# Patient Record
Sex: FEMALE | Race: WHITE | NOT HISPANIC OR LATINO | Employment: OTHER | URBAN - NONMETROPOLITAN AREA
[De-identification: names, ages, dates, MRNs, and addresses within clinical notes are randomized per-mention and may not be internally consistent; named-entity substitution may affect disease eponyms.]

---

## 2017-03-29 ENCOUNTER — HISTORY (OUTPATIENT)
Dept: FAMILY MEDICINE | Facility: OTHER | Age: 27
End: 2017-03-29

## 2017-03-29 ENCOUNTER — OFFICE VISIT - GICH (OUTPATIENT)
Dept: FAMILY MEDICINE | Facility: OTHER | Age: 27
End: 2017-03-29

## 2017-03-29 DIAGNOSIS — Z30.09 ENCOUNTER FOR OTHER GENERAL COUNSELING AND ADVICE ON CONTRACEPTION: ICD-10-CM

## 2017-03-29 ASSESSMENT — PATIENT HEALTH QUESTIONNAIRE - PHQ9: SUM OF ALL RESPONSES TO PHQ QUESTIONS 1-9: 4

## 2017-07-27 ENCOUNTER — HISTORY (OUTPATIENT)
Dept: FAMILY MEDICINE | Facility: OTHER | Age: 27
End: 2017-07-27

## 2017-07-27 ENCOUNTER — COMMUNICATION - GICH (OUTPATIENT)
Dept: FAMILY MEDICINE | Facility: OTHER | Age: 27
End: 2017-07-27

## 2017-07-27 ENCOUNTER — OFFICE VISIT - GICH (OUTPATIENT)
Dept: FAMILY MEDICINE | Facility: OTHER | Age: 27
End: 2017-07-27

## 2017-07-27 DIAGNOSIS — Z11.3 ENCOUNTER FOR SCREENING FOR INFECTIONS WITH PREDOMINANTLY SEXUAL MODE OF TRANSMISSION: ICD-10-CM

## 2017-07-27 DIAGNOSIS — Z00.00 ENCOUNTER FOR GENERAL ADULT MEDICAL EXAMINATION WITHOUT ABNORMAL FINDINGS: ICD-10-CM

## 2017-07-27 DIAGNOSIS — N92.6 IRREGULAR MENSTRUATION: ICD-10-CM

## 2017-07-27 DIAGNOSIS — Z12.4 ENCOUNTER FOR SCREENING FOR MALIGNANT NEOPLASM OF CERVIX: ICD-10-CM

## 2017-07-27 LAB — HCG UR QL: NEGATIVE

## 2017-08-04 ENCOUNTER — AMBULATORY - GICH (OUTPATIENT)
Dept: EMERGENCY MEDICINE | Facility: OTHER | Age: 27
End: 2017-08-04

## 2017-08-04 ENCOUNTER — HISTORY (OUTPATIENT)
Dept: EMERGENCY MEDICINE | Facility: OTHER | Age: 27
End: 2017-08-04

## 2017-08-04 LAB
BACTERIA URINE: ABNORMAL BACTERIA/HPF
BILIRUB UR QL: ABNORMAL
CLARITY, URINE: CLEAR CLARITY
COLOR UR: YELLOW COLOR
EPITHELIAL CELLS: ABNORMAL EPI/HPF
GLUCOSE URINE: NEGATIVE MG/DL
KETONES UR QL: NEGATIVE MG/DL
LEUKOCYTE ESTERASE URINE: NEGATIVE
NITRITE UR QL STRIP: NEGATIVE
OCCULT BLOOD,URINE - HISTORICAL: ABNORMAL
OTHER: ABNORMAL
PH UR: 5.5 [PH]
PROTEIN QUALITATIVE,URINE - HISTORICAL: NEGATIVE MG/DL
RBC - HISTORICAL: ABNORMAL /HPF
SP GR UR STRIP: 1.02
UROBILINOGEN,QUALITATIVE - HISTORICAL: NORMAL EU/DL
WBC - HISTORICAL: ABNORMAL /HPF

## 2017-08-18 ENCOUNTER — HISTORY (OUTPATIENT)
Dept: FAMILY MEDICINE | Facility: OTHER | Age: 27
End: 2017-08-18

## 2017-08-18 ENCOUNTER — OFFICE VISIT - GICH (OUTPATIENT)
Dept: FAMILY MEDICINE | Facility: OTHER | Age: 27
End: 2017-08-18

## 2017-08-18 DIAGNOSIS — Z30.430 ENCOUNTER FOR INSERTION OF INTRAUTERINE CONTRACEPTIVE DEVICE: ICD-10-CM

## 2017-08-18 LAB — HCG UR QL: NEGATIVE

## 2017-12-17 ENCOUNTER — HEALTH MAINTENANCE LETTER (OUTPATIENT)
Age: 27
End: 2017-12-17

## 2017-12-27 NOTE — PROGRESS NOTES
Patient Information     Patient Name MRN Sex Caryl Cartwright [id number] Female 1990      Progress Notes by Lakshmi De La Paz MD at 2017  9:30 AM     Author:  Lakshmi De La Paz MD Service:  (none) Author Type:  Physician     Filed:  2017  3:16 PM Encounter Date:  2017 Status:  Signed     :  Lakshmi De La Paz MD (Physician)            25 yo female presents for IUD insertion. Recent STD screen negative. LMP 2 weeks ago    UPT  Negative    /70  Pulse 90  Wt 66.3 kg (146 lb 3.2 oz)  LMP 2017  BMI 22.9 kg/m2  ABd: soft nondistended  Gyn: uterus normal size and contour    Informed consent obtained. Patient is aware of potential risks of bleeding, infection uterine perforation. Patient has a funny position. Speculum is inserted. Cervix appears normal. Cleaned with betadine. Tenaculum was placed on the anterior lip. Gentle traction is applied and uterine sound was inserted. 7 cm. IUD placed without difficulty. Strings clipped 3 cm outside cervical os. Patient tolerated procedure well without complications.    IMpression:    ICD-10-CM    1. Encounter for IUD insertion Z30.430 Urine pregnancy test (HCG), qualitative      Urine pregnancy test (HCG), qualitative      levonorgestrel intrauterine device 1 Device (MIRENA)     Plan: check strings every month, discussed STD prevention    Lakshmi Stubbs MD  3:16 PM 2017

## 2017-12-28 NOTE — TELEPHONE ENCOUNTER
Patient Information     Patient Name MRN Caryl Montaño [id number] Female 1990      Telephone Encounter by Marnie Boswell at 2017  9:05 AM     Author:  Marnie Boswell Service:  (none) Author Type:  (none)     Filed:  2017  9:10 AM Encounter Date:  2017 Status:  Signed     :  Marnie Boswell            Patient states has been seeing someone and no longer on bc. And having some weird spotting.   Marnie Boswell LPN .............2017  9:07 AM  lmp 17. suggested be seen for labs for HCG.  Marnie Boswell LPN .............2017  9:10 AM

## 2017-12-28 NOTE — PROGRESS NOTES
Patient Information     Patient Name MRN Sex Caryl Cartwright [id number] Female 1990      Progress Notes by Lakshmi De La Paz MD at 2017 10:45 AM     Author:  Lakshmi De La Paz MD Service:  (none) Author Type:  Physician     Filed:  2017  2:23 PM Encounter Date:  2017 Status:  Signed     :  Lakshmi De La Paz MD (Physician)            SUBJECTIVE:    Caryl Andres is a 26 y.o. female who presents for irregular menses    HPI Comments: Dark brown discharge since , LMP 2017  Took OCPs x 1 month in April  New sexual partner x 3 months, no using condoms        No Known Allergies,   No current outpatient prescriptions on file prior to visit.     No current facility-administered medications on file prior to visit.     and   Patient Active Problem List      Diagnosis Date Noted     Twin pregnancy, antepartum 10/19/2014     Active labor 10/19/2014      delivery 10/19/2014     Gestational hypertension 2014     Twin gestation, monochorionic/diamniotic (one placenta, two amniotic sacs)(V91.02) 2014     Cervicalgia 2012     NEPHROLITHIASIS 2012     LUMBAGO 2012       REVIEW OF SYSTEMS:  Review of Systems   Constitutional: Negative for chills, fever, malaise/fatigue and weight loss.   Respiratory: Negative for cough and shortness of breath.    Cardiovascular: Negative for chest pain and palpitations.   Gastrointestinal: Negative for abdominal pain, blood in stool, constipation, diarrhea, nausea and vomiting.   Genitourinary: Negative for dysuria and frequency.   Neurological: Negative for headaches.   Psychiatric/Behavioral: Negative for depression.       OBJECTIVE:  /74  Pulse 86  Wt 68 kg (150 lb)  LMP 2017  BMI 23.49 kg/m2    EXAM:   Physical Exam   Constitutional: She is well-developed, well-nourished, and in no distress.   HENT:   Head: Normocephalic.   Right Ear: External ear normal.   Nose: Nose normal.    Mouth/Throat: Oropharynx is clear and moist.   Cardiovascular: Normal rate.    No murmur heard.  Pulmonary/Chest: Effort normal and breath sounds normal. No respiratory distress.   Abdominal: Soft. Bowel sounds are normal. She exhibits no distension.   Genitourinary: Uterus normal, cervix normal, right adnexa normal and left adnexa normal. Vaginal discharge found.   Musculoskeletal: She exhibits no edema.   Psychiatric: Affect normal.     Results for orders placed or performed in visit on 07/27/17       Urine pregnancy test (HCG), qualitative       Result  Value Ref Range Status    PREGNANCY,URINE           Negative Negative Final       ASSESSMENT/PLAN:    ICD-10-CM    1. Missed period N92.6 Urine pregnancy test (HCG), qualitative      Urine pregnancy test (HCG), qualitative   2. Cervical cancer screening Z12.4 GYN THIN PREP PAP SCREEN IMAGED      GYN THIN PREP PAP SCREEN IMAGED   3. Screen for STD (sexually transmitted disease) Z11.3 GC CHLAMYDIA TRACH PROBE      GC CHLAMYDIA TRACH PROBE        Plan:  STd screen, due for pap. Appears on exam the menses is starting now. Discussed contraception options, she plans IUD insertion next week. Encouraged condoms  Lakshmi Stubbs MD  2:22 PM 7/27/2017

## 2017-12-30 NOTE — NURSING NOTE
Patient Information     Patient Name MRN Sex Caryl Cartwright [id number] Female 1990      Nursing Note by Marnie Boswell at 2017 10:45 AM     Author:  Marnie Boswell Service:  (none) Author Type:  (none)     Filed:  2017 10:37 AM Encounter Date:  2017 Status:  Signed     :  Marnie Boswell            Patient is here for possible pregnancy. States not on bc and seeing someone, having abnormal spotting.  Marnie Boswell LPN .............2017  10:13 AM

## 2017-12-30 NOTE — NURSING NOTE
Patient Information     Patient Name MRN Sex Caryl Cartwright [id number] Female 1990      Nursing Note by Marnie Boswell at 2017  9:30 AM     Author:  Marnie Boswell Service:  (none) Author Type:  (none)     Filed:  2017  9:57 AM Encounter Date:  2017 Status:  Signed     :  Marnie Boswell            Patient is here to have iud placed.  Marnie Boswell LPN .............2017  9:26 AM    Universal Protocol    A. Pre-procedure verification complete yes  1-relevant information / documentation available, reviewed and properly matched to the patient; 2-consent accurate and complete, 3-equipment and supplies available    B. Site marking complete Yes  Site marked if not in continuous attendance with patient    C. TIME OUT completed yes  Time Out was conducted just prior to starting procedure to verify the eight required elements: 1-patient identity, 2-consent accurate and complete, 3-position, 4-correct side/site marked (if applicable), 5-procedure, 6-relevant images / results properly labeled and displayed (if applicable), 7-antibiotics / irrigation fluids (if applicable), 8-safety precautions.

## 2018-01-04 NOTE — NURSING NOTE
Patient Information     Patient Name MRN Caryl Montaño [id number] Female 1990      Nursing Note by Marnie Boswell at 3/29/2017 10:30 AM     Author:  Marnie Boswell Service:  (none) Author Type:  (none)     Filed:  3/29/2017 11:03 AM Encounter Date:  3/29/2017 Status:  Signed     :  Marnie Boswell            Patient is here to discuss birth control and options.  Marnie Boswell LPN .............3/29/2017  10:44 AM

## 2018-01-04 NOTE — PROGRESS NOTES
Patient Information     Patient Name MRN Sex Caryl Cartwright [id number] Female 1990      Progress Notes by Lakshmi De La Paz MD at 3/29/2017 10:30 AM     Author:  Lakshmi De La Paz MD Service:  (none) Author Type:  Physician     Filed:  3/31/2017  8:31 AM Encounter Date:  3/29/2017 Status:  Signed     :  Lakshmi De La Paz MD (Physician)            SUBJECTIVE:    Caryl Andres is a 26 y.o. female who presents for birth control    HPI Comments:  presents to discuss birth control. They have decided they are done having children, do not want to do anything permanent  She has regular menses, light, no abnormal pap  Nonsmoker, no h/o VTE  Same partner > 5 years      No Known Allergies and   No current outpatient prescriptions on file prior to visit.     No current facility-administered medications on file prior to visit.        REVIEW OF SYSTEMS:  Review of Systems   Constitutional: Negative for chills, fever and weight loss.   Respiratory: Negative for shortness of breath.    Cardiovascular: Negative for chest pain.   Musculoskeletal: Negative for joint pain.   Neurological: Negative for dizziness and headaches.   Psychiatric/Behavioral: Negative for depression.       OBJECTIVE:  /66  Pulse 81  Wt 67.9 kg (149 lb 9.6 oz)  LMP 2017 (LMP Unknown)  BMI 23.43 kg/m2    EXAM:   Physical Exam   Constitutional: She is well-developed, well-nourished, and in no distress.   Neck: No thyromegaly present.   Psychiatric: Affect and judgment normal.       ASSESSMENT/PLAN:    ICD-10-CM    1. Birth control counseling Z30.9 levonorgestrel-ethinyl estrad, 0.15-30 mg-mcg, (LEVLEN; NORDETTE-28) 0.15-0.03 mg tablet        Plan:  Reviewed options for birth control, she would like to try the pill, will start combined OCPs on first day of next menses, due for pap smear  Total of 15 minutes was spent in counseling and coordination of care.    Lakshmi Stubbs MD  8:29 AM  3/31/2017     There are no Patient Instructions on file for this visit.

## 2018-01-27 VITALS
SYSTOLIC BLOOD PRESSURE: 116 MMHG | HEART RATE: 90 BPM | SYSTOLIC BLOOD PRESSURE: 111 MMHG | HEART RATE: 81 BPM | WEIGHT: 149.6 LBS | BODY MASS INDEX: 22.9 KG/M2 | DIASTOLIC BLOOD PRESSURE: 70 MMHG | BODY MASS INDEX: 23.43 KG/M2 | DIASTOLIC BLOOD PRESSURE: 66 MMHG | WEIGHT: 146.2 LBS

## 2018-01-27 VITALS
SYSTOLIC BLOOD PRESSURE: 128 MMHG | DIASTOLIC BLOOD PRESSURE: 74 MMHG | WEIGHT: 150 LBS | HEART RATE: 86 BPM | BODY MASS INDEX: 23.49 KG/M2

## 2018-01-30 ASSESSMENT — PATIENT HEALTH QUESTIONNAIRE - PHQ9: SUM OF ALL RESPONSES TO PHQ QUESTIONS 1-9: 4

## 2018-02-05 ENCOUNTER — DOCUMENTATION ONLY (OUTPATIENT)
Dept: FAMILY MEDICINE | Facility: OTHER | Age: 28
End: 2018-02-05

## 2018-03-04 ENCOUNTER — HEALTH MAINTENANCE LETTER (OUTPATIENT)
Age: 28
End: 2018-03-04

## 2020-02-28 ENCOUNTER — HOSPITAL ENCOUNTER (OUTPATIENT)
Dept: GENERAL RADIOLOGY | Facility: OTHER | Age: 30
End: 2020-02-28
Attending: FAMILY MEDICINE
Payer: COMMERCIAL

## 2020-02-28 ENCOUNTER — OFFICE VISIT (OUTPATIENT)
Dept: FAMILY MEDICINE | Facility: OTHER | Age: 30
End: 2020-02-28
Attending: FAMILY MEDICINE
Payer: COMMERCIAL

## 2020-02-28 VITALS
SYSTOLIC BLOOD PRESSURE: 116 MMHG | RESPIRATION RATE: 16 BRPM | WEIGHT: 153.4 LBS | DIASTOLIC BLOOD PRESSURE: 78 MMHG | TEMPERATURE: 97.9 F | BODY MASS INDEX: 24.08 KG/M2 | OXYGEN SATURATION: 97 % | HEART RATE: 100 BPM | HEIGHT: 67 IN

## 2020-02-28 DIAGNOSIS — R10.9 ABDOMINAL CRAMPING: Primary | ICD-10-CM

## 2020-02-28 DIAGNOSIS — K59.00 CONSTIPATION, UNSPECIFIED CONSTIPATION TYPE: ICD-10-CM

## 2020-02-28 DIAGNOSIS — R10.9 ABDOMINAL CRAMPING: ICD-10-CM

## 2020-02-28 DIAGNOSIS — R39.15 URINARY URGENCY: ICD-10-CM

## 2020-02-28 LAB
ALBUMIN UR-MCNC: NEGATIVE MG/DL
APPEARANCE UR: CLEAR
BILIRUB UR QL STRIP: NEGATIVE
COLOR UR AUTO: ABNORMAL
GLUCOSE UR STRIP-MCNC: NEGATIVE MG/DL
HGB UR QL STRIP: ABNORMAL
KETONES UR STRIP-MCNC: NEGATIVE MG/DL
LEUKOCYTE ESTERASE UR QL STRIP: NEGATIVE
MUCOUS THREADS #/AREA URNS LPF: PRESENT /LPF
NITRATE UR QL: NEGATIVE
PH UR STRIP: 5.5 PH (ref 5–7)
RBC #/AREA URNS AUTO: 2 /HPF (ref 0–2)
SOURCE: ABNORMAL
SP GR UR STRIP: 1.01 (ref 1–1.03)
UROBILINOGEN UR STRIP-MCNC: NORMAL MG/DL (ref 0–2)
WBC #/AREA URNS AUTO: <1 /HPF (ref 0–5)

## 2020-02-28 PROCEDURE — 74018 RADEX ABDOMEN 1 VIEW: CPT

## 2020-02-28 PROCEDURE — G0463 HOSPITAL OUTPT CLINIC VISIT: HCPCS

## 2020-02-28 PROCEDURE — 99213 OFFICE O/P EST LOW 20 MIN: CPT | Performed by: FAMILY MEDICINE

## 2020-02-28 PROCEDURE — 81001 URINALYSIS AUTO W/SCOPE: CPT | Mod: ZL | Performed by: FAMILY MEDICINE

## 2020-02-28 ASSESSMENT — ENCOUNTER SYMPTOMS
FREQUENCY: 1
VOMITING: 0
DIARRHEA: 0
DYSURIA: 0
NAUSEA: 1

## 2020-02-28 ASSESSMENT — PATIENT HEALTH QUESTIONNAIRE - PHQ9: SUM OF ALL RESPONSES TO PHQ QUESTIONS 1-9: 8

## 2020-02-28 ASSESSMENT — MIFFLIN-ST. JEOR: SCORE: 1445.51

## 2020-02-28 ASSESSMENT — PAIN SCALES - GENERAL: PAINLEVEL: MODERATE PAIN (4)

## 2020-02-28 NOTE — PROGRESS NOTES
"  SUBJECTIVE:   Caryl Andres is a 29 year old female who presents to clinic today for the following health issues:    HPI  Abdominal Cramping, Constipation:  Symptoms started on Saturday with abdominal cramping.  Reports radiation to her low back.  Has been worse with activity and improved with rest.  Her last bowel movement was yesterday but reports that her stools have looked like diarrhea and \"small alena.\"  She states this is irregular for her and has been occurring over the past week.  She noticed blood on the toilet paper this morning when wiping.  LMP was 2/16/20.    Urinary Urgency:  Onset of symptoms with above.  Reports associated urgency.  No real dysuria but is concerned about possible urinary tract infection.    Past Medical History:   Diagnosis Date     Closed fracture of shaft of humerus     No Comments Provided     Excessive and frequent menstruation with regular cycle     No Comments Provided     Personal history of other medical treatment (CODE)     G2, P1, SAB1 (7 weeks)      Past Surgical History:   Procedure Laterality Date     ELBOW SURGERY      Fracture repair ?pinning     History reviewed. No pertinent family history.  Social History     Tobacco Use     Smoking status: Current Every Day Smoker     Packs/day: 1.00     Years: 10.00     Pack years: 10.00     Smokeless tobacco: Never Used   Substance Use Topics     Alcohol use: Yes     Comment: Alcoholic Drinks/day: rare     Current Outpatient Medications   Medication Sig Dispense Refill     levonorgestrel (MIRENA) 20 MCG/24HR IUD 1 Device by Intrauterine route       No Known Allergies    Review of Systems   Cardiovascular: Negative for chest pain.   Gastrointestinal: Positive for nausea. Negative for diarrhea and vomiting.   Genitourinary: Positive for frequency and urgency. Negative for dysuria.      OBJECTIVE:     /78   Pulse 100   Temp 97.9  F (36.6  C) (Temporal)   Resp 16   Ht 1.689 m (5' 6.5\")   Wt 69.6 kg (153 lb 6.4 " oz)   LMP 02/21/2020   SpO2 97%   BMI 24.39 kg/m    Body mass index is 24.39 kg/m .  Physical Exam  Constitutional:       General: She is not in acute distress.     Appearance: Normal appearance. She is not ill-appearing.   Cardiovascular:      Rate and Rhythm: Normal rate and regular rhythm.      Heart sounds: No murmur. No friction rub. No gallop.    Pulmonary:      Effort: Pulmonary effort is normal.      Breath sounds: Normal breath sounds. No wheezing, rhonchi or rales.   Abdominal:      General: Bowel sounds are normal.      Palpations: Abdomen is soft.      Tenderness: There is abdominal tenderness in the right lower quadrant, suprapubic area and left lower quadrant. There is no guarding or rebound. Negative signs include Reagan's sign, Rovsing's sign, psoas sign and obturator sign.   Genitourinary:     Exam position: Lithotomy position.      Labia:         Right: No lesion or injury.         Left: No lesion or injury.       Urethra: No prolapse.      Cervix: Cervical bleeding present. No friability or erythema.      Comments: Blood pooled in vaginal vault.  IUD strings in place.  Neurological:      Mental Status: She is alert.       Diagnostic Test Results:  Results for orders placed or performed during the hospital encounter of 02/28/20   XR Abdomen 1 View     Status: None    Narrative    XR ABDOMEN 1 VW    HISTORY: 29 years Female Please comment on stool burden; Abdominal  cramping; Constipation, unspecified constipation type    COMPARISON: None    TECHNIQUE: Single view abdomen    FINDINGS: There is a small volume of stool in the colon. No abnormally  distended loops of bowel are present.      Impression    IMPRESSION: Small volume of stool in the colon.    An intrauterine device is present.    NAE MALONEY MD   Results for orders placed or performed in visit on 02/28/20   UA reflex to Microscopic and Culture     Status: Abnormal   Result Value Ref Range    Color Urine Light Yellow     Appearance  Urine Clear     Glucose Urine Negative NEG^Negative mg/dL    Bilirubin Urine Negative NEG^Negative    Ketones Urine Negative NEG^Negative mg/dL    Specific Gravity Urine 1.015 1.003 - 1.035    Blood Urine Small (A) NEG^Negative    pH Urine 5.5 5.0 - 7.0 pH    Protein Albumin Urine Negative NEG^Negative mg/dL    Urobilinogen mg/dL Normal 0.0 - 2.0 mg/dL    Nitrite Urine Negative NEG^Negative    Leukocyte Esterase Urine Negative NEG^Negative    Source Midstream Urine     RBC Urine 2 0 - 2 /HPF    WBC Urine <1 0 - 5 /HPF    Mucous Urine Present (A) NEG^Negative /LPF     ASSESSMENT/PLAN:     1. Abdominal cramping  Suspect secondary to menstrual cycle which is earlier than expected.  Discussed that this can be a normal variant.  Recommend NSAID medications as needed for pain relief and continued monitoring.  Return for reevaluation if symptoms worsening or failing to improve.  - XR Abdomen 1 View; Future    2. Constipation, unspecified constipation type  Small amount of stool evident on KUB and history suggestive of leak-around constipation.  Dicussed adequate hydration, increasing dietary fiber, and use of Miralax daily until bowel movements regular.  - XR Abdomen 1 View; Future    3. Urinary urgency  Urinalysis negative for infection.  May be related to above.  Return for reevaluation if symptoms worsening or failing to improve.  - UA reflex to Microscopic and Culture      DO JOSE ALEJANDRO Link [facility] AND [facility]

## 2020-02-28 NOTE — NURSING NOTE
"Chief Complaint   Patient presents with     Abdominal Pain     check IUD     Saturday she had a fever, abd cramps and some bleeding.    Initial /78   Pulse 100   Temp 97.9  F (36.6  C) (Temporal)   Resp 16   Ht 1.689 m (5' 6.5\")   Wt 69.6 kg (153 lb 6.4 oz)   LMP 02/21/2020   SpO2 97%   BMI 24.39 kg/m   Estimated body mass index is 24.39 kg/m  as calculated from the following:    Height as of this encounter: 1.689 m (5' 6.5\").    Weight as of this encounter: 69.6 kg (153 lb 6.4 oz).    Medication Reconciliation: complete      Norma J. Gosselin, LPN  "

## 2022-09-12 ENCOUNTER — OFFICE VISIT (OUTPATIENT)
Dept: FAMILY MEDICINE | Facility: OTHER | Age: 32
End: 2022-09-12
Attending: FAMILY MEDICINE
Payer: COMMERCIAL

## 2022-09-12 VITALS
SYSTOLIC BLOOD PRESSURE: 122 MMHG | OXYGEN SATURATION: 97 % | HEART RATE: 87 BPM | TEMPERATURE: 98 F | WEIGHT: 163.8 LBS | RESPIRATION RATE: 16 BRPM | DIASTOLIC BLOOD PRESSURE: 64 MMHG | BODY MASS INDEX: 26.04 KG/M2

## 2022-09-12 DIAGNOSIS — Z12.4 CERVICAL CANCER SCREENING: ICD-10-CM

## 2022-09-12 DIAGNOSIS — Z30.432 ENCOUNTER FOR IUD REMOVAL: ICD-10-CM

## 2022-09-12 DIAGNOSIS — Z00.00 ANNUAL PHYSICAL EXAM: Primary | ICD-10-CM

## 2022-09-12 PROCEDURE — 87624 HPV HI-RISK TYP POOLED RSLT: CPT | Mod: ZL | Performed by: FAMILY MEDICINE

## 2022-09-12 PROCEDURE — 99395 PREV VISIT EST AGE 18-39: CPT | Mod: 25 | Performed by: FAMILY MEDICINE

## 2022-09-12 PROCEDURE — G0463 HOSPITAL OUTPT CLINIC VISIT: HCPCS

## 2022-09-12 PROCEDURE — 58301 REMOVE INTRAUTERINE DEVICE: CPT | Performed by: FAMILY MEDICINE

## 2022-09-12 PROCEDURE — G0123 SCREEN CERV/VAG THIN LAYER: HCPCS | Performed by: FAMILY MEDICINE

## 2022-09-12 ASSESSMENT — PAIN SCALES - GENERAL: PAINLEVEL: NO PAIN (0)

## 2022-09-12 NOTE — NURSING NOTE
"Chief Complaint   Patient presents with     IUD     removal       Initial /64   Pulse 87   Temp 98  F (36.7  C) (Tympanic)   Resp 16   Wt 74.3 kg (163 lb 12.8 oz)   LMP 09/12/2022   SpO2 97%   BMI 26.04 kg/m   Estimated body mass index is 26.04 kg/m  as calculated from the following:    Height as of 2/28/20: 1.689 m (5' 6.5\").    Weight as of this encounter: 74.3 kg (163 lb 12.8 oz).  Medication Reconciliation: complete    Elena Vuong LPN    Advance Care Directive reviewed    "

## 2022-09-12 NOTE — PROGRESS NOTES
Assessment & Plan     Annual physical exam  Patient is counseled on importance of regular self breast exams and mammograms every 1-2 years starting at age 40. Patient will proceed with pap smears every 3-5 years until age 65. Discussed importance of calcium and vitamin D supplementation and osteoporosis screening. Immunizations are updated based on CDC recommendations and patients desire. Reviewed importance of sunscreen, limit sun exposure and monitoring for changing moles with ABCDEs. Recommend seatbelt use and helmets with biking, skiing and ATV/Snowmobile use.    Recommend COVID-vaccine.    Cervical cancer screening  Pap smear obtained today  - Pap Screen with HPV - recommended age 30 - 65 years    Encounter for IUD removal  IUD removed easily.  Patient declines contraception.               Nicotine/Tobacco Cessation:  She reports that she has been smoking. She has a 10.00 pack-year smoking history. She has never used smokeless tobacco.  Nicotine/Tobacco Cessation Plan:   Information offered: Patient not interested at this time      Regular exercise  See Patient Instructions    No follow-ups on file.    Lakshmi De La Paz MD  [facility] AND [facility]   Caryl is a 32 year old, presenting for the following health issues:    32-year-old G2, P3 presents for IUD removal and pap smear.  It was placed after delivery of her twins.  She is considering attempts at pregnancy with her new partner.  She gets an irregular menses.  Last Pap smear in 2016 was normal.  Patient currently smokes 1 pack of cigarettes per day.  No interest in quitting.    IUD (removal)      IUD               Review of Systems   Constitutional, HEENT, cardiovascular, pulmonary, gi and gu systems are negative, except as otherwise noted.      Objective    /64   Pulse 87   Temp 98  F (36.7  C) (Tympanic)   Resp 16   Wt 74.3 kg (163 lb 12.8 oz)   LMP 09/12/2022   SpO2 97%   BMI 26.04 kg/m    Body mass index is  26.04 kg/m .  Physical Exam  Abdominal:      General: Abdomen is flat. Bowel sounds are normal.   Genitourinary:     General: Normal vulva.      Comments: Moderate blood.  Cervix appears noninflamed.  IUD string strings are grasped with a ring forceps and with gentle traction easily removed.  Pap smear is not obtained.  Bimanual exam reveals a firm small nontender uterus.  Neurological:      Mental Status: She is alert.

## 2022-09-16 LAB
BKR LAB AP GYN ADEQUACY: NORMAL
BKR LAB AP GYN INTERPRETATION: NORMAL
BKR LAB AP HPV REFLEX: NORMAL
BKR LAB AP LMP: NORMAL
BKR LAB AP PREVIOUS ABNORMAL: NORMAL
PATH REPORT.COMMENTS IMP SPEC: NORMAL
PATH REPORT.COMMENTS IMP SPEC: NORMAL
PATH REPORT.RELEVANT HX SPEC: NORMAL

## 2022-09-22 LAB
HUMAN PAPILLOMA VIRUS 16 DNA: NEGATIVE
HUMAN PAPILLOMA VIRUS 18 DNA: NEGATIVE
HUMAN PAPILLOMA VIRUS FINAL DIAGNOSIS: NORMAL
HUMAN PAPILLOMA VIRUS OTHER HR: NEGATIVE

## 2022-12-07 ENCOUNTER — OFFICE VISIT (OUTPATIENT)
Dept: FAMILY MEDICINE | Facility: OTHER | Age: 32
End: 2022-12-07
Attending: NURSE PRACTITIONER
Payer: COMMERCIAL

## 2022-12-07 VITALS
OXYGEN SATURATION: 98 % | TEMPERATURE: 99.1 F | RESPIRATION RATE: 20 BRPM | SYSTOLIC BLOOD PRESSURE: 122 MMHG | DIASTOLIC BLOOD PRESSURE: 80 MMHG | HEART RATE: 86 BPM | BODY MASS INDEX: 26.23 KG/M2 | WEIGHT: 165 LBS

## 2022-12-07 DIAGNOSIS — J06.9 VIRAL URI: ICD-10-CM

## 2022-12-07 DIAGNOSIS — H66.90 ACUTE OTITIS MEDIA, UNSPECIFIED OTITIS MEDIA TYPE: Primary | ICD-10-CM

## 2022-12-07 PROCEDURE — 99213 OFFICE O/P EST LOW 20 MIN: CPT

## 2022-12-07 PROCEDURE — G0463 HOSPITAL OUTPT CLINIC VISIT: HCPCS

## 2022-12-07 ASSESSMENT — PAIN SCALES - GENERAL: PAINLEVEL: MILD PAIN (3)

## 2022-12-07 NOTE — PROGRESS NOTES
ASSESSMENT/PLAN:    Differential Diagnoses:     (H66.90) Acute otitis media, unspecified otitis media type  (primary encounter diagnosis)  Comment: Bilateral with pain.   Plan: amoxicillin-clavulanate (AUGMENTIN) 875-125 MG         tablet       For your ear infection you may start your antibiotic- Augmentin. Make sure you take with food and also take a daily probiotic while on the antibiotic. Complete the full course of antibiotic even if you are feeling better. Follow up if ear pain persists after 2-3 days of treatment or if you experience worsening of symptoms.     (J06.9) Viral URI  Comment: Symptoms consistent with viral URI.   Plan:   Discussed with patient that symptoms and exam are consistent with viral illness.  Discussed that symptomatic treatment of cough is appropriate but not with antibiotics.      Symptomatic treatment - Encouraged fluids, salt water gargles, honey (only if greater than 1 year in age due to risk of botulism), elevation, humidifier, sinus rinse/netti pot, lozenges, tea, topical vapor rub, popsicles, rest, etc     May use over-the-counter Tylenol or ibuprofen PRN    Discussed warning signs/symptoms indicative of need to f/u    Follow up if symptoms persist or worsen or concerns    I have reviewed the nursing notes.  I have reviewed the findings, diagnosis, plan and need for follow up with the patient.    I explained my diagnostic considerations and recommendations to the patient, who voiced understanding and agreement with the treatment plan. All questions were answered. We discussed potential side effects of any prescribed or recommended therapies, as well as expectations for response to treatments.    SHANNON HOLDER CNP  12/7/2022  11:04 AM    HPI:    Caryl Andres is a 32 year old female  who presents to [facility] today for concerns of URI and otalgia.    Patient notes onset of symptoms about a week ago. Symptoms include cough and sore throat which have persisted. She  also endorses rhinorrhea, congestion, and fatigue. Appetite stable, able to take fluids well. No N/V/D. No rashes. She does endorse bilateral otalgia x 1 day, she states they feel full and she hears a whooshing noise. Hearing is muffled at times. No tinnitus. Yesterday she noticed she was a little off balance but has since resolved.     Home treatment: tea, water, vicks, etc.     PCP: Dr. De La Paz    Allergies: NKA    No known exposures to anything.     Past Medical History:   Diagnosis Date     Closed fracture of shaft of humerus     No Comments Provided     Excessive and frequent menstruation with regular cycle     No Comments Provided     Personal history of other medical treatment (CODE)     G2, P1, SAB1 (7 weeks)     Past Surgical History:   Procedure Laterality Date     ELBOW SURGERY      Fracture repair ?pinning     Social History     Tobacco Use     Smoking status: Every Day     Packs/day: 1.00     Years: 10.00     Pack years: 10.00     Types: Cigarettes     Smokeless tobacco: Never   Substance Use Topics     Alcohol use: Yes     Comment: Alcoholic Drinks/day: rare     Current Outpatient Medications   Medication Sig Dispense Refill     levonorgestrel (MIRENA) 20 MCG/24HR IUD 1 Device by Intrauterine route (Patient not taking: Reported on 12/7/2022)       No Known Allergies  Past medical history, past surgical history, current medications and allergies reviewed and accurate to the best of my knowledge.      ROS:  Refer to HPI    /80 (BP Location: Right arm, Patient Position: Sitting, Cuff Size: Adult Regular)   Pulse 86   Temp 99.1  F (37.3  C) (Tympanic)   Resp 20   Wt 74.8 kg (165 lb)   LMP 11/14/2022 (Exact Date)   SpO2 98%   BMI 26.23 kg/m      EXAM:  General Appearance: Well appearing 32 year old female, appropriate appearance for age. No acute distress   Ears: Left TM intact, with erythema, mild bulging, and purulence.  Right TM intact, with erythema, bulging, and purulence..  Left  auditory canal clear.  Right auditory canal clear.  Normal external ears, non tender.  Eyes: conjunctivae normal without erythema or irritation, corneas clear, no drainage or crusting, no eyelid swelling, pupils equal   Oropharynx: moist mucous membranes, posterior pharynx with erythema, no exudates or petechiae, no post nasal drip seen, no trismus, voice clear.    Nose:  Bilateral nares: no erythema, no edema, no drainage or congestion   Neck: supple  Respiratory: normal chest wall and respirations.  Normal effort.  Clear to auscultation bilaterally, no wheezing, crackles or rhonchi.  No increased work of breathing.  No cough appreciated.  Cardiac: RRR with no murmurs  Abdomen: soft, nontender, no rigidity, no rebound tenderness or guarding, normal bowel sounds present    Musculoskeletal:  Equal movement of bilateral upper extremities.  Equal movement of bilateral lower extremities.  Normal gait.    Dermatological: no rashes noted of exposed skin  Neuro: Alert and oriented to person, place, and time.  Cranial nerves II-XII grossly intact with no focal or lateralizing deficits.  Muscle tone normal.  Gait normal. No tremor.   Psychological: normal affect, alert, oriented, and pleasant.

## 2022-12-07 NOTE — NURSING NOTE
Pt presents to clinic today for  A sore throat worse at night and in the mornings, congestion, cough and ear ache for the last week.       FOOD SECURITY SCREENING QUESTIONS:    The next two questions are to help us understand your food security.  If you are feeling you need any assistance in this area, we have resources available to support you today.    Hunger Vital Signs:  Within the past 12 months we worried whether our food would run out before we got money to buy more. Never  Within the past 12 months the food we bought just didn't last and we didn't have money to get more. Never             Medication Reconciliation: Aftab Brown, BRAYDON,LPN on 12/7/2022 at 10:48 AM

## 2022-12-07 NOTE — PATIENT INSTRUCTIONS
For your ear infection you may start your antibiotic- Augmentin. Make sure you take with food and also take a daily probiotic while on the antibiotic. Complete the full course of antibiotic even if you are feeling better. Follow up if ear pain persists after 2-3 days of treatment or if you experience worsening of symptoms.     You have a viral URI, I recommend symptomatic treatment. Symptomatic treatment - Encouraged fluids, salt water gargles, honey (only if greater than 1 year in age due to risk of botulism), elevation, humidifier, sinus rinse/netti pot, lozenges, tea, topical vapor rub, popsicles, rest, etc

## 2024-01-10 ENCOUNTER — VIRTUAL VISIT (OUTPATIENT)
Dept: OBGYN | Facility: OTHER | Age: 34
End: 2024-01-10
Payer: COMMERCIAL

## 2024-01-10 VITALS — WEIGHT: 172 LBS | HEIGHT: 67 IN | BODY MASS INDEX: 27 KG/M2

## 2024-01-10 DIAGNOSIS — Z32.01 PREGNANCY TEST POSITIVE: Primary | ICD-10-CM

## 2024-01-10 PROCEDURE — 99207 PR OB VISIT-NO CHARGE - GICH ONLY: CPT | Mod: 93

## 2024-01-10 RX ORDER — L.ACID/L.CASEI/B.BIF/B.LON/FOS 2B CELL-50
CAPSULE ORAL
COMMUNITY

## 2024-01-10 ASSESSMENT — ANXIETY QUESTIONNAIRES
1. FEELING NERVOUS, ANXIOUS, OR ON EDGE: SEVERAL DAYS
5. BEING SO RESTLESS THAT IT IS HARD TO SIT STILL: NOT AT ALL
GAD7 TOTAL SCORE: 5
7. FEELING AFRAID AS IF SOMETHING AWFUL MIGHT HAPPEN: NOT AT ALL
GAD7 TOTAL SCORE: 5
6. BECOMING EASILY ANNOYED OR IRRITABLE: SEVERAL DAYS
2. NOT BEING ABLE TO STOP OR CONTROL WORRYING: SEVERAL DAYS
3. WORRYING TOO MUCH ABOUT DIFFERENT THINGS: SEVERAL DAYS
IF YOU CHECKED OFF ANY PROBLEMS ON THIS QUESTIONNAIRE, HOW DIFFICULT HAVE THESE PROBLEMS MADE IT FOR YOU TO DO YOUR WORK, TAKE CARE OF THINGS AT HOME, OR GET ALONG WITH OTHER PEOPLE: SOMEWHAT DIFFICULT

## 2024-01-10 ASSESSMENT — PAIN SCALES - GENERAL: PAINLEVEL: NO PAIN (0)

## 2024-01-10 ASSESSMENT — PATIENT HEALTH QUESTIONNAIRE - PHQ9
SUM OF ALL RESPONSES TO PHQ QUESTIONS 1-9: 7
5. POOR APPETITE OR OVEREATING: SEVERAL DAYS

## 2024-01-10 NOTE — PROGRESS NOTES
Verbal consent obtained for telephone visit. Total length of call: 15 min    HPI:    This is a 33 year old female patient,  who was called today for OB Intake visit. Patient reports positive pregnancy test at home.     Obstetrical history and OB Demographics updated to the best of this nurse's ability based on patient report. PHQ-9 depression screening and routine Domestic Abuse screening completed. All immediate questions and concerns answered.    FOOD SECURITY SCREENING QUESTIONS:    The next two questions are to help us understand your food security.  If you are feeling you need any assistance in this area, we have resources available to support you today.    Hunger Vital Signs:  Within the past 12 months we worried whether our food would run out before we got money to buy more. Never  Within the past 12 months the food we bought just didn't last and we didn't have money to get more. Never    Last menstrual period is reported as Patient's last menstrual period was 2023 (exact date). KAROLINA based on LMP is Estimated Date of Delivery: Aug 24, 2024.  Her cycles are regular.  Her last menstrual period was normal.   Since her LMP, she has experienced  nausea, abdominal pain, fatigue, headache, and urinary urgency.       OBSTETRIC HISTORY:    OB History    Para Term  AB Living   4 2 1 1 1 3   SAB IAB Ectopic Multiple Live Births   1 0 0 1 3      # Outcome Date GA Lbr Sergio/2nd Weight Sex Delivery Anes PTL Lv   4 Current            3A  10/19/14 36w2d   F    VIGNESH      Name: Marianela Rosarioddard   3B  10/19/14 36w2d   F    VIGNESH      Name: Bacilio Rosarioddard   2 Term 12 38w0d  3.714 kg (8 lb 3 oz) F Vag-Spont EPI  VIGNESH      Name: Markel Barger   1 SAB 11               Age of first pregnancy: 22  Previous OB Provider: Cal  Previous Delivering Clinic: [facility]  Release of Records: none    Current delivery plan: [facility]  Preferred OB Provider: Laureen Mehta  Current Primary Care  Provider: Ana Cristina  Pediatrician: Ana Cristina    Additional History: twin pregnancy with HTN, resolved gestational thrombocytopenia    Have you travelled during the pregnancy?No  Have your sexual partner(s) travelled during the pregnancy?No      HISTORY:   Planned Pregnancy: Welcomed, not planned and not unplanned  Marital Status: Single  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   Family and father of baby are supportive of current pregnancy.  Past Medical History of Father of Baby:No significant medical history    Past History:  Her past medical history   Past Medical History:   Diagnosis Date    Closed fracture of shaft of humerus     No Comments Provided    Excessive and frequent menstruation with regular cycle     No Comments Provided    Personal history of other medical treatment (CODE)     G2, P1, SAB1 (7 weeks)   .      Her past surgical history:   Past Surgical History:   Procedure Laterality Date    ELBOW SURGERY      Fracture repair ?pinning       She has a history of  pre-term delivery at 36 weeks and pregnancy induced hypertension, twin pregnancy    Since her last LMP she denies use of alcohol, tobacco and street drugs and admits to the use of vape and cigarettes, working on quitting .    Pap smear history: Last 3 Pap and HPV Results:       Latest Ref Rng & Units 9/12/2022    10:48 AM   PAP / HPV   PAP  Negative for Intraepithelial Lesion or Malignancy (NILM)    HPV 16 DNA Negative Negative    HPV 18 DNA Negative Negative    Other HR HPV Negative Negative        STD/STI history: No STD history    STD/STI symptoms: no noticeable symptoms     Past medical, surgical, social and family history were reviewed and updated in EPIC.    Medications reviewed by this nurse. Current medication list:  Current Outpatient Medications   Medication Sig Dispense Refill    Prenatal  Vit-Fe Fumarate-FA (PRENATAL MULTIVITAMIN  PLUS IRON) 27-1 MG TABS Take 1 tablet by mouth daily      Probiotic Product (PROBIOTIC BLEND) CAPS        The following medications were recommended to be discontinued due to Pregnancy Category D status: denies  Patient informed to contact her primary care provider as soon as possible to discuss a safer alternative.    Risk factors:  Moderate and moderately severe risks (consult with OB/Gyn)  Previous fetal or  demise: No  History of  delivery: No  History of heart disease Class I: No  Severe anemia, unresponsive to iron therapy: No  Pelvic mass or neoplasm: No  Previous : No  Hyper/hypothyroidism: No  History of postpartum hemorrhage requiring transfusion:No  History of Placenta Accreta: No    High Risk (Pregnancy managed by OB/Gyn)  Multiple pregnancy: Yes  Pre-gestational diabetes: No  Chronic Hypertension: No  Renal Failure: No  Heart disease, class II or greater: No  Rh Isoimmunization: No  Chronic active hepatitis: No  Convulsive disorder, poorly controlled: No  Isoimmune thrombocytopenia: No  Pre-term premature rupture of membranes: No  Lupus or other autoimmune disorder: No  Human Immunodeficiency Virus: No      ASSESSMENT/PLAN:       ICD-10-CM    1. Pregnancy test positive  Z32.01           33 year old , 7w4d of pregnancy with KAROLINA of 2024, by Last Menstrual Period    Per standing orders and scope of practice of this nurse, patient will have the following orders placed and completed prior to initial OB visit with the appropriate provider:    --early ultrasound for dating and viability ordered for 6+ weeks gestation based on LMP    --Quantitative Beta HCG and progesterone monitoring if indicated    Counseling given:     - Recommended weight gain for pregnancy: 15-25 lbs.   BMI < 18.5  28-40 lbs   18.5 - 24.9 25-35   25 - 29.9 15-25   > 30  < 15       PLAN/PATIENT INSTRUCTIONS:    Normal exercise.  Normal sexual activity.  Prenatal  vitamins.  Anticipated weight gain.    follow-up appointment with Dr. Mehta for pre-marcial care and take multivitamin or pre-marcial vitamins    Clarita Rascon RN.................................................. 1/10/2024 8:42 AM

## 2024-01-24 LAB
ABO/RH(D): NORMAL
ANTIBODY SCREEN: NEGATIVE
SPECIMEN EXPIRATION DATE: NORMAL

## 2024-01-25 ENCOUNTER — HOSPITAL ENCOUNTER (OUTPATIENT)
Dept: ULTRASOUND IMAGING | Facility: OTHER | Age: 34
Discharge: HOME | End: 2024-01-25
Payer: COMMERCIAL

## 2024-01-25 ENCOUNTER — PRENATAL OFFICE VISIT (OUTPATIENT)
Dept: OBGYN | Facility: OTHER | Age: 34
End: 2024-01-25
Attending: OBSTETRICS & GYNECOLOGY
Payer: COMMERCIAL

## 2024-01-25 VITALS
DIASTOLIC BLOOD PRESSURE: 76 MMHG | HEART RATE: 80 BPM | WEIGHT: 177 LBS | BODY MASS INDEX: 27.72 KG/M2 | SYSTOLIC BLOOD PRESSURE: 108 MMHG

## 2024-01-25 DIAGNOSIS — Z34.81 ENCOUNTER FOR SUPERVISION OF OTHER NORMAL PREGNANCY IN FIRST TRIMESTER: Primary | ICD-10-CM

## 2024-01-25 DIAGNOSIS — Z32.01 PREGNANCY TEST POSITIVE: ICD-10-CM

## 2024-01-25 LAB
ALBUMIN MFR UR ELPH: <6 MG/DL
ALBUMIN SERPL BCG-MCNC: 4.1 G/DL (ref 3.5–5.2)
ALP SERPL-CCNC: 44 U/L (ref 40–150)
ALT SERPL W P-5'-P-CCNC: 16 U/L (ref 0–50)
ANION GAP SERPL CALCULATED.3IONS-SCNC: 10 MMOL/L (ref 7–15)
AST SERPL W P-5'-P-CCNC: 20 U/L (ref 0–45)
BASOPHILS # BLD AUTO: 0.1 10E3/UL (ref 0–0.2)
BASOPHILS NFR BLD AUTO: 1 %
BILIRUB SERPL-MCNC: 0.2 MG/DL
BUN SERPL-MCNC: 8.9 MG/DL (ref 6–20)
C TRACH DNA SPEC QL PROBE+SIG AMP: NEGATIVE
CALCIUM SERPL-MCNC: 9 MG/DL (ref 8.6–10)
CHLORIDE SERPL-SCNC: 102 MMOL/L (ref 98–107)
CREAT SERPL-MCNC: 0.55 MG/DL (ref 0.51–0.95)
CREAT UR-MCNC: 12.9 MG/DL
DEPRECATED HCO3 PLAS-SCNC: 23 MMOL/L (ref 22–29)
EGFRCR SERPLBLD CKD-EPI 2021: >90 ML/MIN/1.73M2
EOSINOPHIL # BLD AUTO: 0.1 10E3/UL (ref 0–0.7)
EOSINOPHIL NFR BLD AUTO: 1 %
ERYTHROCYTE [DISTWIDTH] IN BLOOD BY AUTOMATED COUNT: 11.7 % (ref 10–15)
GLUCOSE SERPL-MCNC: 81 MG/DL (ref 70–99)
HCT VFR BLD AUTO: 37.4 % (ref 35–47)
HGB BLD-MCNC: 13.1 G/DL (ref 11.7–15.7)
IMM GRANULOCYTES # BLD: 0 10E3/UL
IMM GRANULOCYTES NFR BLD: 0 %
LYMPHOCYTES # BLD AUTO: 1.9 10E3/UL (ref 0.8–5.3)
LYMPHOCYTES NFR BLD AUTO: 22 %
MCH RBC QN AUTO: 32.4 PG (ref 26.5–33)
MCHC RBC AUTO-ENTMCNC: 35 G/DL (ref 31.5–36.5)
MCV RBC AUTO: 93 FL (ref 78–100)
MONOCYTES # BLD AUTO: 0.6 10E3/UL (ref 0–1.3)
MONOCYTES NFR BLD AUTO: 7 %
N GONORRHOEA DNA SPEC QL NAA+PROBE: NEGATIVE
NEUTROPHILS # BLD AUTO: 6.1 10E3/UL (ref 1.6–8.3)
NEUTROPHILS NFR BLD AUTO: 69 %
NRBC # BLD AUTO: 0 10E3/UL
NRBC BLD AUTO-RTO: 0 /100
PLATELET # BLD AUTO: 205 10E3/UL (ref 150–450)
POTASSIUM SERPL-SCNC: 3.9 MMOL/L (ref 3.4–5.3)
PROT SERPL-MCNC: 6.7 G/DL (ref 6.4–8.3)
PROT/CREAT 24H UR: NORMAL MG/G{CREAT}
RBC # BLD AUTO: 4.04 10E6/UL (ref 3.8–5.2)
SODIUM SERPL-SCNC: 135 MMOL/L (ref 135–145)
WBC # BLD AUTO: 8.8 10E3/UL (ref 4–11)

## 2024-01-25 PROCEDURE — 82040 ASSAY OF SERUM ALBUMIN: CPT | Mod: ZL | Performed by: OBSTETRICS & GYNECOLOGY

## 2024-01-25 PROCEDURE — 84156 ASSAY OF PROTEIN URINE: CPT | Mod: ZL | Performed by: OBSTETRICS & GYNECOLOGY

## 2024-01-25 PROCEDURE — 87389 HIV-1 AG W/HIV-1&-2 AB AG IA: CPT | Mod: ZL | Performed by: OBSTETRICS & GYNECOLOGY

## 2024-01-25 PROCEDURE — 99207 PR OB VISIT-NO CHARGE - GICH ONLY: CPT | Performed by: OBSTETRICS & GYNECOLOGY

## 2024-01-25 PROCEDURE — 86780 TREPONEMA PALLIDUM: CPT | Mod: ZL | Performed by: OBSTETRICS & GYNECOLOGY

## 2024-01-25 PROCEDURE — 86762 RUBELLA ANTIBODY: CPT | Mod: ZL | Performed by: OBSTETRICS & GYNECOLOGY

## 2024-01-25 PROCEDURE — 87340 HEPATITIS B SURFACE AG IA: CPT | Mod: ZL | Performed by: OBSTETRICS & GYNECOLOGY

## 2024-01-25 PROCEDURE — 86900 BLOOD TYPING SEROLOGIC ABO: CPT | Mod: ZL | Performed by: OBSTETRICS & GYNECOLOGY

## 2024-01-25 PROCEDURE — 87086 URINE CULTURE/COLONY COUNT: CPT | Mod: ZL | Performed by: OBSTETRICS & GYNECOLOGY

## 2024-01-25 PROCEDURE — 85025 COMPLETE CBC W/AUTO DIFF WBC: CPT | Mod: ZL | Performed by: OBSTETRICS & GYNECOLOGY

## 2024-01-25 PROCEDURE — 76801 OB US < 14 WKS SINGLE FETUS: CPT

## 2024-01-25 PROCEDURE — 86803 HEPATITIS C AB TEST: CPT | Mod: ZL | Performed by: OBSTETRICS & GYNECOLOGY

## 2024-01-25 PROCEDURE — 87491 CHLMYD TRACH DNA AMP PROBE: CPT | Mod: ZL | Performed by: OBSTETRICS & GYNECOLOGY

## 2024-01-25 PROCEDURE — 36415 COLL VENOUS BLD VENIPUNCTURE: CPT | Mod: ZL | Performed by: OBSTETRICS & GYNECOLOGY

## 2024-01-25 NOTE — PROGRESS NOTES
New Obstetrics Visit    HPI: 33 year old  at 9w5d by LMP c/w 9w5d US here today for initial OB visit. Her LMP was 23, was having regular cycles and this is an exact date. This was a somewhat planned pregnancy, wasn't trying but wasn't preventing. Has been more nauseated and fatigued this pregnancy compared to her last two but has been starting to improve the last few days. No cramping or VB.    OBHx  OB History    Para Term  AB Living   4 2 1 1 1 3   SAB IAB Ectopic Multiple Live Births   1 0 0 1 3      # Outcome Date GA Lbr Sergio/2nd Weight Sex Delivery Anes PTL Lv   4 Current            3A  10/19/14 36w2d   F    VIGNESH      Name: Marianela Barger   3B  10/19/14 36w2d   F    VIGNESH      Name: Bacilio Barger   2 Term 12 38w0d  3.714 kg (8 lb 3 oz) F Vag-Spont EPI  VIGNESH      Name: Markel Barger   1 SAB 11                 PMHx:   Past Medical History:   Diagnosis Date    Closed fracture of shaft of humerus     No Comments Provided    Excessive and frequent menstruation with regular cycle     No Comments Provided    Kidney stone     Personal history of other medical treatment (CODE)     G2, P1, SAB1 (7 weeks)    Varicella       PSHx:   Past Surgical History:   Procedure Laterality Date    ELBOW SURGERY      Fracture repair ?pinning      Meds:   Current Outpatient Medications   Medication    Prenatal Vit-Fe Fumarate-FA (PRENATAL MULTIVITAMIN  PLUS IRON) 27-1 MG TABS    Probiotic Product (PROBIOTIC BLEND) CAPS     No current facility-administered medications for this visit.     Allergies:   No Known Allergies    SocHx:   Social History     Tobacco Use    Smoking status: Former     Packs/day: 0.00     Years: 10.00     Additional pack years: 0.00     Total pack years: 0.00     Types: Cigarettes     Passive exposure: Past    Smokeless tobacco: Never   Vaping Use    Vaping Use: Some days    Substances: Nicotine, Flavoring    Devices: Disposable   Substance Use Topics    Alcohol  use: Not Currently     Comment: Alcoholic Drinks/day: rare    Drug use: Never     Lives with SO Bobo and 3 daughters. This is their first together.     FamHx:   Family History   Problem Relation Age of Onset    Diabetes Type 2  Mother     Hypertension Mother     Hypertension Father     No Known Problems Sister         ROS: 10-Point ROS negative except as noted in HPI      Physical Exam  /76   Pulse 80   Wt 80.3 kg (177 lb)   LMP 2023 (Exact Date)   BMI 27.72 kg/m    Body mass index is 27.72 kg/m .  Gen: Well-appearing, NAD  HEENT: Normocephalic, atraumatic  Neck: Thyroid is not enlarged, no appreciable masses palpated. Non-tender  CV:  RRR, no m/r/g auscultated  Pulm: CTAB, no w/r/r auscultated  Abd: Soft, non-tender, non-distended  Ext: No LE edema, extremities warm and well perfused    Pelvic:  Normal appearing external female genitalia. No vaginal lesions. Small white discharge. Cervix normal, no lesions. Uterus is small, mobile, non-tender. No adnexal tenderness or masses    Assessment/Plan:  Ms. Caryl Andres is a 33 year old  at 9w5d by LMP c/w 9w5d US, here for new OB visit.   1. Hx of  delivery at 36w2d for mono/di twins  2. Hx of gestational HTN: baseline HELLP labs today. Discussed reasoning for ASA, will consider before next visit  3. Hx of gestational thrombocytopenia    4. PNC: New OB Labs ord'd  5. Genetics: declines  6. Imaging: dating US at 9w5d   7. Immunizations: declines flu    Follow up in 4 weeks.    Rosa Mehta MD  OB/GYN  2024 12:45 PM

## 2024-01-25 NOTE — NURSING NOTE
Chief Complaint   Patient presents with    Prenatal Care     OB PX 9w5d       Medication Reconciliation: complete    Patient presents to the clinic for OB PX 9w5d  Patient stated that she has been nauseous stated that it has been better the past couple of days, stated no emesis.       Lamar Sneed LPN

## 2024-01-26 LAB
HBV SURFACE AG SERPL QL IA: NONREACTIVE
HCV AB SERPL QL IA: NONREACTIVE
HIV 1+2 AB+HIV1 P24 AG SERPL QL IA: NONREACTIVE
RUBV IGG SERPL QL IA: 2.89 INDEX
RUBV IGG SERPL QL IA: POSITIVE
T PALLIDUM AB SER QL: NONREACTIVE

## 2024-01-27 LAB — BACTERIA UR CULT: NO GROWTH

## 2024-02-15 ENCOUNTER — OFFICE VISIT (OUTPATIENT)
Dept: FAMILY MEDICINE | Facility: OTHER | Age: 34
End: 2024-02-15
Attending: STUDENT IN AN ORGANIZED HEALTH CARE EDUCATION/TRAINING PROGRAM
Payer: COMMERCIAL

## 2024-02-15 VITALS
DIASTOLIC BLOOD PRESSURE: 68 MMHG | HEART RATE: 91 BPM | SYSTOLIC BLOOD PRESSURE: 118 MMHG | HEIGHT: 67 IN | TEMPERATURE: 99.1 F | BODY MASS INDEX: 28.09 KG/M2 | RESPIRATION RATE: 16 BRPM | WEIGHT: 179 LBS | OXYGEN SATURATION: 99 %

## 2024-02-15 DIAGNOSIS — J10.1 INFLUENZA B: Primary | ICD-10-CM

## 2024-02-15 DIAGNOSIS — R05.1 ACUTE COUGH: ICD-10-CM

## 2024-02-15 DIAGNOSIS — J02.9 SORE THROAT: ICD-10-CM

## 2024-02-15 LAB
FLUAV RNA SPEC QL NAA+PROBE: NEGATIVE
FLUBV RNA RESP QL NAA+PROBE: POSITIVE
GROUP A STREP BY PCR: NOT DETECTED
RSV RNA SPEC NAA+PROBE: NEGATIVE
SARS-COV-2 RNA RESP QL NAA+PROBE: NEGATIVE

## 2024-02-15 PROCEDURE — 99213 OFFICE O/P EST LOW 20 MIN: CPT

## 2024-02-15 PROCEDURE — G0463 HOSPITAL OUTPT CLINIC VISIT: HCPCS

## 2024-02-15 PROCEDURE — 87637 SARSCOV2&INF A&B&RSV AMP PRB: CPT | Mod: ZL

## 2024-02-15 PROCEDURE — 87651 STREP A DNA AMP PROBE: CPT | Mod: ZL

## 2024-02-15 ASSESSMENT — PAIN SCALES - GENERAL: PAINLEVEL: SEVERE PAIN (7)

## 2024-02-15 NOTE — PROGRESS NOTES
ASSESSMENT/PLAN:    I have reviewed the nursing notes.  I have reviewed the findings, diagnosis, plan and need for follow up with the patient.    1. Influenza B  2. Sore throat  3. Acute cough  - Symptomatic Influenza A/B, RSV, & SARS-CoV2 PCR (COVID-19) Nose  - Group A Streptococcus PCR Throat Swab    Patient presents with upper respiratory symptoms.  Patient's vitals are stable and she appears nontoxic.  Patient tested positive for influenza B.  Discussed with patient that unfortunately she is beyond the treatment window for Tamiflu.  Recommended that patient quarantine for 3 to 5 days from symptom onset and then be fever free for 24 hours. Discussed symptomatic treatment - Encouraged fluids, salt water gargles, honey (only if greater than 1 year in age due to risk of botulism), elevation, humidifier, sinus rinse/netti pot, lozenges, tea, topical vapor rub, popsicles, rest, etc. May use over-the-counter Tylenol PRN.    Discussed warning signs/symptoms indicative of need to f/u    Follow up if symptoms persist or worsen or concerns    I explained my diagnostic considerations and recommendations to the patient, who voiced understanding and agreement with the treatment plan. All questions were answered. We discussed potential side effects of any prescribed or recommended therapies, as well as expectations for response to treatments.    Jeremiah Eagle, SHANNON CNP  2/15/2024  9:28 AM    HPI:    Caryl Andres is a 33 year old female  who presents to [facility] today for concerns of URI symptoms    URI, x 5 days    Symptoms:  YES: +  fevers or chills. Fever, highest reported temperature: 101.7 F  YES: +  sore throat/pharyngitis/tonsillitis.   YES: +  allergy/URI Symptoms  No muffled sounds/change in hearing  No sensation of fullness in ear(s)  No ringing in ears/tinnitus  No balance changes  No dizziness  YES: +  congestion (head/nasal/chest)  YES: +  cough/productive cough  YES: +  post nasal drip   YES: +   "headache  No sinus pain/pressure  YES: +  myalgias  No otalgia  No rash  Activity Level Changes: Yes: increased fatigue  Appetite/Liquid Intake Changes: No  Changes to Bowel Habits: Yes: diarrhea  Changes to Bladder Habits: No  Additional Symptoms to Report: No  History of similar symptoms: No  Prior workup: No    Treatments tried: Tylenol/Ibuprofen, Fluids, and Rest    Site of exposure: not known.  Type of exposure: not known    Other Pertinent History: currently pregnant    Allergies: NKA    PCP: Ana Cristina    Past Medical History:   Diagnosis Date    Closed fracture of shaft of humerus     No Comments Provided    Excessive and frequent menstruation with regular cycle     No Comments Provided    Kidney stone     Personal history of other medical treatment (CODE)     G2, P1, SAB1 (7 weeks)    Varicella      Past Surgical History:   Procedure Laterality Date    ELBOW SURGERY      Fracture repair ?pinning     Social History     Tobacco Use    Smoking status: Former     Packs/day: 0.00     Years: 10.00     Additional pack years: 0.00     Total pack years: 0.00     Types: Cigarettes     Passive exposure: Past    Smokeless tobacco: Never   Substance Use Topics    Alcohol use: Not Currently     Comment: Alcoholic Drinks/day: rare     Current Outpatient Medications   Medication Sig Dispense Refill    Prenatal Vit-Fe Fumarate-FA (PRENATAL MULTIVITAMIN  PLUS IRON) 27-1 MG TABS Take 1 tablet by mouth daily      Probiotic Product (PROBIOTIC BLEND) CAPS  (Patient not taking: Reported on 2/15/2024)       No Known Allergies  Past medical history, past surgical history, current medications and allergies reviewed and accurate to the best of my knowledge.      ROS:  Refer to HPI    /68 (BP Location: Right arm, Patient Position: Sitting, Cuff Size: Adult Regular)   Pulse 91   Temp 99.1  F (37.3  C) (Tympanic)   Resp 16   Ht 1.702 m (5' 7\")   Wt 81.2 kg (179 lb)   LMP 11/18/2023 (Exact Date)   SpO2 99%   BMI 28.04 kg/m  "     EXAM:  General Appearance: Well appearing 33 year old female, appropriate appearance for age. No acute distress   Ears: Left TM intact, translucent with bony landmarks appreciated, no erythema, no effusion, no bulging, no purulence.  Right TM intact, translucent with bony landmarks appreciated, no erythema, no effusion, no bulging, no purulence.  Left auditory canal clear.  Right auditory canal clear.  Normal external ears, non tender.  Eyes: conjunctivae normal without erythema or irritation, corneas clear, no drainage or crusting, no eyelid swelling, pupils equal   Oropharynx: moist mucous membranes, posterior pharynx without erythema, tonsils symmetric and 1+, no erythema, no exudates or petechiae, no post nasal drip seen, no trismus, voice clear.    Sinuses:  No sinus tenderness upon palpation of the frontal or maxillary sinuses  Nose:  Bilateral nares: no erythema, no edema, no drainage or congestion   Neck: supple without adenopathy  Respiratory: normal chest wall and respirations.  Normal effort.  Clear to auscultation bilaterally, no wheezing, crackles or rhonchi.  No increased work of breathing.  No cough appreciated.  Cardiac: RRR with no murmurs  Musculoskeletal:  Equal movement of bilateral upper extremities.  Equal movement of bilateral lower extremities.  Normal gait.    Dermatological: no rashes noted of exposed skin  Neuro: Alert and oriented to person, place, and time.    Psychological: normal affect, alert, oriented, and pleasant.     Labs:  Results for orders placed or performed in visit on 02/15/24   Symptomatic Influenza A/B, RSV, & SARS-CoV2 PCR (COVID-19) Nose     Status: Abnormal    Specimen: Nose; Swab   Result Value Ref Range    Influenza A PCR Negative Negative    Influenza B PCR Positive (A) Negative    RSV PCR Negative Negative    SARS CoV2 PCR Negative Negative    Narrative    Testing was performed using the Xpert Xpress CoV2/Flu/RSV Assay on the Cepheid GeneXpert Instrument. This  test should be ordered for the detection of SARS-CoV-2, influenza, and RSV viruses in individuals who meet clinical and/or epidemiological criteria. Test performance is unknown in asymptomatic patients. This test is for in vitro diagnostic use under the FDA EUA for laboratories certified under CLIA to perform high or moderate complexity testing. This test has not been FDA cleared or approved. A negative result does not rule out the presence of PCR inhibitors in the specimen or target RNA in concentration below the limit of detection for the assay. If only one viral target is positive but coinfection with multiple targets is suspected, the sample should be re-tested with another FDA cleared, approved, or authorized test, if coinfection would change clinical management. This test was validated by the [facility]. These laboratories are certified under the Clinical Laboratory Improvement Amendments of 1988 (CLIA-88) as qualified to perform high complexity laboratory testing.   Group A Streptococcus PCR Throat Swab     Status: Normal    Specimen: Throat; Swab   Result Value Ref Range    Group A strep by PCR Not Detected Not Detected    Narrative    The Xpert Xpress Strep A test, performed on the [employer], is a rapid, qualitative in vitro diagnostic test for the detection of Streptococcus pyogenes (Group A ß-hemolytic Streptococcus, Strep A) in throat swab specimens from patients with signs and symptoms of pharyngitis. The Xpert Xpress Strep A test can be used as an aid in the diagnosis of Group A Streptococcal pharyngitis. The assay is not intended to monitor treatment for Group A Streptococcus infections. The Xpert Xpress Strep A test utilizes an automated real-time polymerase chain reaction (PCR) to detect Streptococcus pyogenes DNA.

## 2024-02-15 NOTE — NURSING NOTE
"Pt presents to  with her daughter for sickness since Saturday evening - body aches, fever, congestion, sore throat, and some diarrhea.    Chief Complaint   Patient presents with    Generalized Body Aches    Fever    Pharyngitis       FOOD SECURITY SCREENING QUESTIONS  Hunger Vital Signs:  Within the past 12 months we worried whether our food would run out before we got money to buy more. Never  Within the past 12 months the food we bought just didn't last and we didn't have money to get more. Never  Anjelica Deaakshaton 2/15/2024 9:25 AM      Initial /68 (BP Location: Right arm, Patient Position: Sitting, Cuff Size: Adult Regular)   Pulse 91   Temp 99.1  F (37.3  C) (Tympanic)   Resp 16   Ht 1.702 m (5' 7\")   Wt 81.2 kg (179 lb)   LMP 11/18/2023 (Exact Date)   SpO2 99%   BMI 28.04 kg/m   Estimated body mass index is 28.04 kg/m  as calculated from the following:    Height as of this encounter: 1.702 m (5' 7\").    Weight as of this encounter: 81.2 kg (179 lb).  Medication Reconciliation: complete    Anjelica Deaakshaton  "

## 2024-02-18 ENCOUNTER — HEALTH MAINTENANCE LETTER (OUTPATIENT)
Age: 34
End: 2024-02-18

## 2024-02-22 ENCOUNTER — PRENATAL OFFICE VISIT (OUTPATIENT)
Dept: OBGYN | Facility: OTHER | Age: 34
End: 2024-02-22
Attending: OBSTETRICS & GYNECOLOGY
Payer: COMMERCIAL

## 2024-02-22 VITALS
BODY MASS INDEX: 27.57 KG/M2 | SYSTOLIC BLOOD PRESSURE: 112 MMHG | HEART RATE: 88 BPM | WEIGHT: 176 LBS | DIASTOLIC BLOOD PRESSURE: 68 MMHG

## 2024-02-22 DIAGNOSIS — Z34.81 ENCOUNTER FOR SUPERVISION OF OTHER NORMAL PREGNANCY IN FIRST TRIMESTER: Primary | ICD-10-CM

## 2024-02-22 PROCEDURE — 99207 PR OB VISIT-NO CHARGE - GICH ONLY: CPT

## 2024-02-22 ASSESSMENT — PAIN SCALES - GENERAL: PAINLEVEL: NO PAIN (0)

## 2024-02-22 NOTE — PROGRESS NOTES
OB Visit    S: Patient is feeling well. Denies LOF, VB, or regular Ctx. - FM.    Concerns: none    O: /68 (BP Location: Right arm, Patient Position: Sitting, Cuff Size: Adult Large)   Pulse 88   Wt 79.8 kg (176 lb)   LMP 2023 (Exact Date)   BMI 27.57 kg/m    Gen: Well-appearing, NAD  FHT: 152      A/P:  Caryl Andres is a 33 year old  at 13w5d  by LMP c/w 9w5d US, here for new OB visit.   1. Hx of  delivery at 36w2d for mono/di twins  2. Hx of gestational HTN: baseline HELLP labs today. States she will start ASA  3. Hx of gestational thrombocytopenia- WNL CBC      PNC: We discussed the below recommendations for planning, testing, and add on options as well as detailed any increased risk based on patient history. The subsequent choices and results if any are reflected below.     Prenatal labs WNL, Rh +, Rubella immune, GCT TBD, 3rd Trimester HGB TBD, GBS TBD   Rhogam: Na  Genetics: declines  Imaginw4d  Immunizations: TDAP TBD, COVID declined, FLU declined, RSV TBD  Delivery Plan: NA   BC after delivery: NA     RTC 4 weeks      Katerin ORETGA, ANUJAP-C  2024 2:00 PM

## 2024-02-22 NOTE — NURSING NOTE
Chief Complaint   Patient presents with    Prenatal Care     13w5d       Medication Reconciliation: complete  Pt presents to clinic today for prenatal care had recent Influenza B infection but is improving.     Elena Beckford LPN........................2/22/2024  12:57 PM

## 2024-03-21 ENCOUNTER — PRENATAL OFFICE VISIT (OUTPATIENT)
Dept: OBGYN | Facility: OTHER | Age: 34
End: 2024-03-21
Payer: COMMERCIAL

## 2024-03-21 VITALS
HEART RATE: 88 BPM | DIASTOLIC BLOOD PRESSURE: 76 MMHG | WEIGHT: 182 LBS | BODY MASS INDEX: 28.51 KG/M2 | SYSTOLIC BLOOD PRESSURE: 108 MMHG

## 2024-03-21 DIAGNOSIS — Z87.59 HISTORY OF GESTATIONAL HYPERTENSION: Primary | ICD-10-CM

## 2024-03-21 PROCEDURE — 99207 PR OB VISIT-NO CHARGE - GICH ONLY: CPT

## 2024-03-21 RX ORDER — ASPIRIN 81 MG/1
81 TABLET ORAL DAILY
Qty: 90 TABLET | Refills: 1 | Status: ON HOLD | OUTPATIENT
Start: 2024-03-21 | End: 2024-08-27

## 2024-03-21 NOTE — NURSING NOTE
Chief Complaint   Patient presents with    Prenatal Care     17w5d       Medication Reconciliation: complete        Lamar Sneed LPN

## 2024-03-21 NOTE — PROGRESS NOTES
OB Visit    S: Patient is feeling well. Denies LOF, VB, or regular Ctx. + FM.    Concerns: none    O: /76   Pulse 88   Wt 82.6 kg (182 lb)   LMP 2023 (Exact Date)   BMI 28.51 kg/m    Gen: Well-appearing, NAD  FHT: 146      A/P:  Caryl Andres is a 33 year old  at 17w5d bby LMP c/w 9w5d US, here for new OB visit.   1. Hx of  delivery at 36w2d for mono/di twins  2. Hx of gestational HTN: baseline HELLP labs today. States she will start ASA  3. Hx of gestational thrombocytopenia- WNL CBC  4. Quit smoking      PNC: We discussed the below recommendations for planning, testing, and add on options as well as detailed any increased risk based on patient history. The subsequent choices and results if any are reflected below.      Prenatal labs WNL, Rh +, Rubella immune, GCT TBD, 3rd Trimester HGB TBD, GBS TBD   Rhogam: Na  Genetics: declines  Imaginw4d dating ordered 20 week scan   Immunizations: TDAP TBD, COVID declined, FLU declined, RSV TBD  Delivery Plan: TBD  BC after delivery: TBD    RTC 4 weeks with anatomy scan      Katerin ORTEGA, ANUJAP-C  3/21/2024 11:04 AM

## 2024-04-04 ENCOUNTER — HOSPITAL ENCOUNTER (OUTPATIENT)
Dept: ULTRASOUND IMAGING | Facility: OTHER | Age: 34
Discharge: HOME | End: 2024-04-04
Payer: COMMERCIAL

## 2024-04-04 ENCOUNTER — PRENATAL OFFICE VISIT (OUTPATIENT)
Dept: OBGYN | Facility: OTHER | Age: 34
End: 2024-04-04
Payer: COMMERCIAL

## 2024-04-04 VITALS
DIASTOLIC BLOOD PRESSURE: 70 MMHG | WEIGHT: 183 LBS | HEART RATE: 72 BPM | SYSTOLIC BLOOD PRESSURE: 116 MMHG | BODY MASS INDEX: 28.66 KG/M2

## 2024-04-04 DIAGNOSIS — L98.9 PAINFUL SKIN LESION: ICD-10-CM

## 2024-04-04 DIAGNOSIS — Z86.018 HISTORY OF CHANGING SKIN MOLE: Primary | ICD-10-CM

## 2024-04-04 DIAGNOSIS — Z34.81 ENCOUNTER FOR SUPERVISION OF OTHER NORMAL PREGNANCY IN FIRST TRIMESTER: ICD-10-CM

## 2024-04-04 PROCEDURE — 88305 TISSUE EXAM BY PATHOLOGIST: CPT

## 2024-04-04 PROCEDURE — 76805 OB US >/= 14 WKS SNGL FETUS: CPT

## 2024-04-04 PROCEDURE — 250N000009 HC RX 250

## 2024-04-04 PROCEDURE — 11301 SHAVE SKIN LESION 0.6-1.0 CM: CPT

## 2024-04-04 PROCEDURE — 99207 PR OB VISIT-NO CHARGE - GICH ONLY: CPT

## 2024-04-04 RX ORDER — LIDOCAINE HCL/EPINEPHRINE/PF 2%-1:200K
3 VIAL (ML) INJECTION ONCE
Status: COMPLETED | OUTPATIENT
Start: 2024-04-04 | End: 2024-04-04

## 2024-04-04 RX ADMIN — LIDOCAINE HYDROCHLORIDE,EPINEPHRINE BITARTRATE 3 ML: 20; .005 INJECTION, SOLUTION EPIDURAL; INFILTRATION; INTRACAUDAL; PERINEURAL at 14:51

## 2024-04-04 NOTE — PROGRESS NOTES
OB Visit    S: Patient is feeling well. Denies LOF, VB, or regular Ctx. + FM.    Concerns: painful left thigh mole and is changing in appearance for the last 7 months.     O: /70   Pulse 72   Wt 83 kg (183 lb)   LMP 2023 (Exact Date)   BMI 28.66 kg/m    Gen: Well-appearing, NAD  Us today.   Left Thigh: On dorsal central thigh a 0.8cm round lesion is noted with red base with flaky white slough on surface. Pain to touch.     Thigh lesion removal  Patient was consented for the lesion removal procedure and all questions were answered. She was positioned so the thigh back was exposed. The area was prepped with chloraprep and let dry for 3 minutes. Lidocaine was injected superficially until blanching occurred. The area was given time to adequately anesthetize. The lesion was tehn removed via shave biopsy in one piece with one small edge being left which was removed as well. Pressure was placed on area with drysol applied. This significantly slowed the bleeding. It was touched with silver nitrate then observed for 3 minutes with no returned bleeding. The area was bandaged. The patient voices understanding of care and return instructions.       A/P:  Caryl Andres is a 33 year old  at 19w5d by LMP c/w 9w5d US, here for new OB visit and thigh lesion removal.   1. Hx of  delivery at 36w2d for mono/di twins  2. Hx of gestational HTN: baseline HELLP labs today. States she will start ASA  3. Hx of gestational thrombocytopenia- WNL CBC  4. Quit smoking   5. Thigh lesion (mole) removal done today due to pain and changing- likely AK sent for path.      PNC: We discussed the below recommendations for planning, testing, and add on options as well as detailed any increased risk based on patient history. The subsequent choices and results if any are reflected below.      Prenatal labs WNL, Rh +, Rubella immune, GCT TBD, 3rd Trimester HGB TBD, GBS TBD   Rhogam: Na  Genetics: declines  Imaginw4d  dating ordered 20 week scan   Immunizations: TDAP TBD, COVID declined, FLU declined, RSV TBD  Delivery Plan: TBD  BC after delivery: TBD     RTC 4 weeks      Katerin ORTEGA, ANUJAP-C  4/4/2024 2:41 PM

## 2024-04-04 NOTE — NURSING NOTE
Chief Complaint   Patient presents with    Prenatal Care     19w5d       Medication Reconciliation: complete    Pt presents to clinic today for prenatal care 19w5d. Pt denies any bleeding, or leakage of fluid at this time. States she has felt baby move.  Patient denies contractions.       Lamar Sneed LPN

## 2024-04-05 ENCOUNTER — MYC MEDICAL ADVICE (OUTPATIENT)
Dept: OBGYN | Facility: OTHER | Age: 34
End: 2024-04-05
Payer: COMMERCIAL

## 2024-04-09 LAB
PATH REPORT.COMMENTS IMP SPEC: NORMAL
PATH REPORT.FINAL DX SPEC: NORMAL
PHOTO IMAGE: NORMAL

## 2024-05-02 ENCOUNTER — PRENATAL OFFICE VISIT (OUTPATIENT)
Dept: OBGYN | Facility: OTHER | Age: 34
End: 2024-05-02
Payer: COMMERCIAL

## 2024-05-02 VITALS
TEMPERATURE: 97.5 F | BODY MASS INDEX: 30.09 KG/M2 | DIASTOLIC BLOOD PRESSURE: 66 MMHG | WEIGHT: 192.1 LBS | SYSTOLIC BLOOD PRESSURE: 120 MMHG

## 2024-05-02 DIAGNOSIS — Z34.81 ENCOUNTER FOR SUPERVISION OF OTHER NORMAL PREGNANCY IN FIRST TRIMESTER: Primary | ICD-10-CM

## 2024-05-02 PROCEDURE — 99207 PR OB VISIT-NO CHARGE - GICH ONLY: CPT

## 2024-05-02 ASSESSMENT — PAIN SCALES - GENERAL: PAINLEVEL: NO PAIN (0)

## 2024-05-02 NOTE — PROGRESS NOTES
OB Visit    S: Patient is feeling well. Denies LOF, VB, or regular Ctx. + FM.    Concerns: none    O: /66   Temp 97.5  F (36.4  C) (Tympanic)   Wt 87.1 kg (192 lb 1.6 oz)   LMP 2023 (Exact Date)   BMI 30.09 kg/m    Gen: Well-appearing, NAD  FH: 34  FHT: 153      A/P:  Caryl Andres is a 33 year old  at 23w5d by LMP c/w 9w5d US, here for new OB visit and thigh lesion removal.   1. Hx of  delivery at 36w2d for mono/di twins  2. Hx of gestational HTN: baseline HELLP labs today. States she is taking ASA intermittently.   3. Hx of gestational thrombocytopenia- WNL CBC  4. Quit smoking      PNC: We discussed the below recommendations for planning, testing, and add on options as well as detailed any increased risk based on patient history. The subsequent choices and results if any are reflected below.      Prenatal labs WNL, Rh +, Rubella immune, GCT TBD, 3rd Trimester HGB TBD, GBS TBD   Rhogam: Na  Genetics: declines  Imaginw4d dating ordered 20 week scan   Immunizations: TDAP TBD, COVID declined, FLU declined, RSV TBD  Delivery Plan: TBD  BC after delivery: TBD     RTC 4 weeks      Katerin ORTEGA, FNP-C  2024 12:57 PM

## 2024-05-02 NOTE — NURSING NOTE
"Chief Complaint   Patient presents with    Prenatal Care       Initial /66   Temp 97.5  F (36.4  C) (Tympanic)   Wt 87.1 kg (192 lb 1.6 oz)   LMP 11/18/2023 (Exact Date)   BMI 30.09 kg/m   Estimated body mass index is 30.09 kg/m  as calculated from the following:    Height as of 2/15/24: 1.702 m (5' 7\").    Weight as of this encounter: 87.1 kg (192 lb 1.6 oz).  Medication Reconciliation: complete          "

## 2024-05-16 NOTE — PATIENT INSTRUCTIONS
You have been provided the Any Day Now: Early Labor at Home document.    Additional copies can be found here:  [email or web address]  You have been provided the What I'd Wish I'd Known About Giving Birth document.    Additional copies can be found here:  [email or web address]

## 2024-05-30 ENCOUNTER — PRENATAL OFFICE VISIT (OUTPATIENT)
Dept: OBGYN | Facility: OTHER | Age: 34
End: 2024-05-30
Attending: OBSTETRICS & GYNECOLOGY
Payer: COMMERCIAL

## 2024-05-30 VITALS — DIASTOLIC BLOOD PRESSURE: 74 MMHG | BODY MASS INDEX: 30.64 KG/M2 | SYSTOLIC BLOOD PRESSURE: 120 MMHG | WEIGHT: 195.6 LBS

## 2024-05-30 DIAGNOSIS — Z34.83 ENCOUNTER FOR SUPERVISION OF OTHER NORMAL PREGNANCY IN THIRD TRIMESTER: Primary | ICD-10-CM

## 2024-05-30 LAB
ERYTHROCYTE [DISTWIDTH] IN BLOOD BY AUTOMATED COUNT: 12.9 % (ref 10–15)
GLUCOSE 1H P 50 G GLC PO SERPL-MCNC: 118 MG/DL (ref 70–129)
HCT VFR BLD AUTO: 35.8 % (ref 35–47)
HGB BLD-MCNC: 12.2 G/DL (ref 11.7–15.7)
MCH RBC QN AUTO: 32.4 PG (ref 26.5–33)
MCHC RBC AUTO-ENTMCNC: 34.1 G/DL (ref 31.5–36.5)
MCV RBC AUTO: 95 FL (ref 78–100)
PLATELET # BLD AUTO: 175 10E3/UL (ref 150–450)
RBC # BLD AUTO: 3.76 10E6/UL (ref 3.8–5.2)
WBC # BLD AUTO: 8.8 10E3/UL (ref 4–11)

## 2024-05-30 PROCEDURE — 36415 COLL VENOUS BLD VENIPUNCTURE: CPT | Mod: ZL | Performed by: OBSTETRICS & GYNECOLOGY

## 2024-05-30 PROCEDURE — 85041 AUTOMATED RBC COUNT: CPT | Mod: ZL | Performed by: OBSTETRICS & GYNECOLOGY

## 2024-05-30 PROCEDURE — 99207 PR OB VISIT-NO CHARGE - GICH ONLY: CPT | Performed by: OBSTETRICS & GYNECOLOGY

## 2024-05-30 PROCEDURE — 82950 GLUCOSE TEST: CPT | Mod: ZL | Performed by: OBSTETRICS & GYNECOLOGY

## 2024-05-30 PROCEDURE — 86780 TREPONEMA PALLIDUM: CPT | Mod: ZL | Performed by: OBSTETRICS & GYNECOLOGY

## 2024-05-30 PROCEDURE — 90715 TDAP VACCINE 7 YRS/> IM: CPT

## 2024-05-30 NOTE — NURSING NOTE
Chief Complaint   Patient presents with    Prenatal Care     27w5d       Medication Reconciliation: complete    Pt presents to clinic today for prenatal care 27w5d. Pt denies any bleeding, or leakage of fluid at this time. States baby is moving good. Patient denies contractions.     Shanta Jiang, RN 5/30/2024 11:01 AM

## 2024-05-30 NOTE — PROGRESS NOTES
Return OB Visit    S: Patient is feeling well. No ctx, VB or LOF. +FM    O: /74   Wt 88.7 kg (195 lb 9.6 oz)   LMP 2023 (Exact Date)   BMI 30.64 kg/m    Gen: Well-appearing, NAD  See OB Flowsheet    A/P:  Caryl Andres is a 33 year old  at 27w5d by LMP c/w 9w5d US, here for return OB visit.  Hx of  delivery at 36w2d for mono/di twins    Hx of gestational HTN:   - normal baseline HELLP labs and UPC   - intermittently taking ASA 81mg    Hx of gestational thrombocytopenia    PNC: Rh positive, Rubella immune  Genetics: declined  Imaging: dating US at 9w5d, normal anatomy survey  Immunizations: Tdap 2024   RTC 4 weeks    Rosa Mehta MD FACOG  OB/GYN  2024 11:11 AM

## 2024-05-31 LAB — T PALLIDUM AB SER QL: NONREACTIVE

## 2024-06-27 ENCOUNTER — PRENATAL OFFICE VISIT (OUTPATIENT)
Dept: OBGYN | Facility: OTHER | Age: 34
End: 2024-06-27
Attending: OBSTETRICS & GYNECOLOGY
Payer: COMMERCIAL

## 2024-06-27 VITALS
DIASTOLIC BLOOD PRESSURE: 72 MMHG | TEMPERATURE: 97.6 F | SYSTOLIC BLOOD PRESSURE: 100 MMHG | WEIGHT: 199 LBS | RESPIRATION RATE: 16 BRPM | HEART RATE: 100 BPM | BODY MASS INDEX: 31.17 KG/M2

## 2024-06-27 DIAGNOSIS — Z34.83 ENCOUNTER FOR SUPERVISION OF OTHER NORMAL PREGNANCY IN THIRD TRIMESTER: Primary | ICD-10-CM

## 2024-06-27 PROCEDURE — G0463 HOSPITAL OUTPT CLINIC VISIT: HCPCS

## 2024-06-27 PROCEDURE — 99207 PR OB VISIT-NO CHARGE - GICH ONLY: CPT

## 2024-06-27 ASSESSMENT — PAIN SCALES - GENERAL: PAINLEVEL: NO PAIN (0)

## 2024-06-27 NOTE — NURSING NOTE
"Pt presents to clinic today for prenatal care. Pt denies any bleeding, or leakage of fluid at this time. States baby is moving good. Patient denies contractions.     Chief Complaint   Patient presents with    Establish Care     31 week 5 day       Initial /72 (BP Location: Right arm, Patient Position: Sitting, Cuff Size: Adult Regular)   Pulse 100   Temp 97.6  F (36.4  C) (Tympanic)   Resp 16   Wt 90.3 kg (199 lb)   LMP 11/18/2023 (Exact Date)   BMI 31.17 kg/m   Estimated body mass index is 31.17 kg/m  as calculated from the following:    Height as of 2/15/24: 1.702 m (5' 7\").    Weight as of this encounter: 90.3 kg (199 lb).  Medication Reconciliation: complete        Diann Peñaloza LPN      "

## 2024-06-27 NOTE — PROGRESS NOTES
OB Visit    S: Patient is feeling well. Denies LOF, VB, or regular Ctx. + FM.    Concerns: none     O: /72 (BP Location: Right arm, Patient Position: Sitting, Cuff Size: Adult Regular)   Pulse 100   Temp 97.6  F (36.4  C) (Tympanic)   Resp 16   Wt 90.3 kg (199 lb)   LMP 2023 (Exact Date)   BMI 31.17 kg/m    Gen: Well-appearing, NAD  FH: 31.5  FHT: 139      A/P:  Caryl Andres is a 33 year old  at 31w5d by LMP c/w 9w5d US, here for new OB visit and thigh lesion removal.   1. Hx of  delivery at 36w2d for mono/di twins  2. Hx of gestational HTN: baseline HELLP labs today. States she is taking ASA intermittently.   3. Hx of gestational thrombocytopenia- WNL CBC  4. Quit smoking      PNC: We discussed the below recommendations for planning, testing, and add on options as well as detailed any increased risk based on patient history. The subsequent choices and results if any are reflected below.      Prenatal labs WNL, Rh +, Rubella immune, , 3rd Trimester HGB 12.2, GBS TBD   Rhogam: Na  Genetics: declines  Imaginw4d dating, WNL anatomy 31%ile   Immunizations: TDAP 24, COVID declined, FLU declined, RSV TBD  Delivery Plan: epidural if needed, breastfeeding is able, baby meds   BC after delivery: considering- maybe mirena   Delivery Hx: pelvic proven to 8lbs 3 oz      RTC 2 weeks       Katerin ORTEGA, FNP-C  2024 11:28 AM

## 2024-07-11 ENCOUNTER — PRENATAL OFFICE VISIT (OUTPATIENT)
Dept: OBGYN | Facility: OTHER | Age: 34
End: 2024-07-11
Attending: OBSTETRICS & GYNECOLOGY
Payer: COMMERCIAL

## 2024-07-11 VITALS
WEIGHT: 203 LBS | BODY MASS INDEX: 31.79 KG/M2 | SYSTOLIC BLOOD PRESSURE: 104 MMHG | HEART RATE: 88 BPM | DIASTOLIC BLOOD PRESSURE: 60 MMHG

## 2024-07-11 DIAGNOSIS — Z34.93 ENCOUNTER FOR PREGNANCY RELATED EXAMINATION IN THIRD TRIMESTER: Primary | ICD-10-CM

## 2024-07-11 PROCEDURE — 99207 PR OB VISIT-NO CHARGE - GICH ONLY: CPT

## 2024-07-11 ASSESSMENT — PAIN SCALES - GENERAL: PAINLEVEL: NO PAIN (0)

## 2024-07-11 NOTE — NURSING NOTE
Chief Complaint   Patient presents with    Prenatal Care     33W5D       Medication Reconciliation: complete  Pt presents to clinic today for prenatal care. Pt denies any bleeding, or leakage of fluid at this time. States baby is moving good. Patient denies contractions.   Possibly having some pain moving down leg.       Elena Beckford LPN........................7/11/2024  10:46 AM

## 2024-07-11 NOTE — PROGRESS NOTES
OB Visit    S: Patient is feeling well. Denies LOF, VB, or regular Ctx. + FM.    Concerns: none    O: /60 (BP Location: Right arm, Patient Position: Sitting, Cuff Size: Adult Large)   Pulse 88   Wt 92.1 kg (203 lb)   LMP 2023 (Exact Date)   BMI 31.79 kg/m    Gen: Well-appearing, NAD  FH: 34  FHT: 138      A/P:  Caryl Andres is a 33 year old  at 33w5d by LMP c/w 9w5d US, here for new OB visit and thigh lesion removal.   1. Hx of  delivery at 36w2d for mono/di twins  2. Hx of gestational HTN: baseline HELLP labs. States she is taking ASA intermittently.   3. Hx of gestational thrombocytopenia- WNL CBC  4. Quit smoking      PNC: We discussed the below recommendations for planning, testing, and add on options as well as detailed any increased risk based on patient history. The subsequent choices and results if any are reflected below.      Prenatal labs WNL, Rh +, Rubella immune, , 3rd Trimester HGB 12.2, GBS TBD   Rhogam: Na  Genetics: declines  Imaginw4d dating, WNL anatomy 31%ile   Immunizations: TDAP 24, COVID declined, FLU declined, RSV declined  Delivery Plan: epidural if needed, breastfeeding is able, baby meds   BC after delivery: considering- maybe mirena   Delivery Hx: pelvic proven to 8lbs 3 oz       RTC 2 weeks       Katerin ORTEGA, FNP-C  2024 10:45 AM

## 2024-07-25 ENCOUNTER — PRENATAL OFFICE VISIT (OUTPATIENT)
Dept: OBGYN | Facility: OTHER | Age: 34
End: 2024-07-25
Attending: OBSTETRICS & GYNECOLOGY
Payer: COMMERCIAL

## 2024-07-25 VITALS
WEIGHT: 208 LBS | HEART RATE: 76 BPM | SYSTOLIC BLOOD PRESSURE: 122 MMHG | DIASTOLIC BLOOD PRESSURE: 68 MMHG | BODY MASS INDEX: 32.58 KG/M2

## 2024-07-25 DIAGNOSIS — Z34.93 ENCOUNTER FOR PREGNANCY RELATED EXAMINATION IN THIRD TRIMESTER: Primary | ICD-10-CM

## 2024-07-25 PROCEDURE — 99207 PR OB VISIT-NO CHARGE - GICH ONLY: CPT | Performed by: OBSTETRICS & GYNECOLOGY

## 2024-07-25 ASSESSMENT — PAIN SCALES - GENERAL: PAINLEVEL: NO PAIN (0)

## 2024-07-25 NOTE — NURSING NOTE
Chief Complaint   Patient presents with    Prenatal Care     35w5d       Medication Reconciliation: complete  Pt presents to clinic today for prenatal care. Pt denies any bleeding, or leakage of fluid at this time. States baby is moving good. Patient denies contractions.       Elena Beckford LPN........................7/25/2024  10:43 AM

## 2024-07-25 NOTE — PROGRESS NOTES
Return OB Visit    S: Patient is feeling well. No ctx, VB or LOF. +FM    O: /68 (BP Location: Right arm, Patient Position: Sitting, Cuff Size: Adult Large)   Pulse 76   Wt 94.3 kg (208 lb)   LMP 2023 (Exact Date)   BMI 32.58 kg/m    Gen: Well-appearing, NAD  See OB Flowsheet    A/P:  Caryl Andres is a 33 year old  at 35w5d by LMP c/w 9w5d US, here for return OB visit.  Hx of  delivery at 36w2d for mono/di twins     Hx of gestational HTN:   - normal baseline HELLP labs and UPC   - intermittently taking ASA 81mg     Hx of gestational thrombocytopenia  Quit smoking     PNC: Rh positive, Rubella immune,   Genetics: declined  Imaging: dating US at 9w5d, normal anatomy survey  Immunizations: Tdap 2024. Declines RSV  RTC weekly, GBS next visit       Rosa Mehta MD FACOG  OB/GYN  2024 11:12 AM

## 2024-08-01 ENCOUNTER — PRENATAL OFFICE VISIT (OUTPATIENT)
Dept: OBGYN | Facility: OTHER | Age: 34
End: 2024-08-01
Attending: OBSTETRICS & GYNECOLOGY
Payer: COMMERCIAL

## 2024-08-01 VITALS
WEIGHT: 210.1 LBS | DIASTOLIC BLOOD PRESSURE: 70 MMHG | BODY MASS INDEX: 32.91 KG/M2 | SYSTOLIC BLOOD PRESSURE: 134 MMHG | HEART RATE: 96 BPM

## 2024-08-01 DIAGNOSIS — Z34.93 ENCOUNTER FOR PREGNANCY RELATED EXAMINATION IN THIRD TRIMESTER: Primary | ICD-10-CM

## 2024-08-01 PROCEDURE — 87653 STREP B DNA AMP PROBE: CPT | Mod: ZL

## 2024-08-01 PROCEDURE — 99207 PR OB VISIT-NO CHARGE - GICH ONLY: CPT

## 2024-08-01 NOTE — NURSING NOTE
Pt presents to clinic today for prenatal care 36w5d. Pt denies any contractions, bleeding, or leakage of fluid at this time. States baby is moving good.      Medication Reconciliation: complete  Donna Tomlinson LPN

## 2024-08-01 NOTE — PROGRESS NOTES
OB Visit    S: Patient is feeling well. Denies LOF, VB, or regular Ctx.+  FM.    Concerns: none    O: /70   Pulse 96   Wt 95.3 kg (210 lb 1.6 oz)   LMP 2023 (Exact Date)   BMI 32.91 kg/m    Gen: Well-appearing, NAD  FH: 37  FHT: 146  CX: 1.550      A/P:  Caryl Andres is a 33 year old  at 36w5d  by LMP c/w 9w5d US, here for new OB visit and thigh lesion removal.   1. Hx of  delivery at 36w2d for mono/di twins  2. Hx of gestational HTN: baseline HELLP labs. States she is taking ASA intermittently.   3. Hx of gestational thrombocytopenia- WNL CBC  4. Quit smoking      PNC: We discussed the below recommendations for planning, testing, and add on options as well as detailed any increased risk based on patient history. The subsequent choices and results if any are reflected below.      Prenatal labs WNL, Rh +, Rubella immune, , 3rd Trimester HGB 12.2, GBS Today   Rhogam: Na  Genetics: declines  Imaginw4d dating, WNL anatomy 31%ile   Immunizations: TDAP 24, COVID declined, FLU declined, RSV declined  Delivery Plan: epidural if needed, breastfeeding is able, baby meds   BC after delivery: considering- maybe mirena   Delivery Hx: pelvic proven to 8lbs 3 oz      RTC 1 week      Katerin ORTEGA, RICHMOND-C  2024 10:56 AM

## 2024-08-02 LAB — GP B STREP DNA SPEC QL NAA+PROBE: POSITIVE

## 2024-08-08 ENCOUNTER — PRENATAL OFFICE VISIT (OUTPATIENT)
Dept: OBGYN | Facility: OTHER | Age: 34
End: 2024-08-08
Attending: OBSTETRICS & GYNECOLOGY
Payer: COMMERCIAL

## 2024-08-08 VITALS
HEART RATE: 110 BPM | SYSTOLIC BLOOD PRESSURE: 124 MMHG | WEIGHT: 210.1 LBS | RESPIRATION RATE: 16 BRPM | OXYGEN SATURATION: 98 % | BODY MASS INDEX: 32.91 KG/M2 | TEMPERATURE: 97.1 F | DIASTOLIC BLOOD PRESSURE: 80 MMHG

## 2024-08-08 DIAGNOSIS — Z34.93 ENCOUNTER FOR PREGNANCY RELATED EXAMINATION IN THIRD TRIMESTER: Primary | ICD-10-CM

## 2024-08-08 PROCEDURE — 99207 PR OB VISIT-NO CHARGE - GICH ONLY: CPT

## 2024-08-08 PROCEDURE — G0463 HOSPITAL OUTPT CLINIC VISIT: HCPCS

## 2024-08-08 ASSESSMENT — PAIN SCALES - GENERAL: PAINLEVEL: NO PAIN (0)

## 2024-08-08 NOTE — PROGRESS NOTES
OB Visit    S: Patient is feeling well. Denies LOF, VB, or regular Ctx. + FM.    Concerns: none    O: /80   Pulse 110   Temp 97.1  F (36.2  C) (Tympanic)   Resp 16   Wt 95.3 kg (210 lb 1.6 oz)   LMP 2023 (Exact Date)   SpO2 98%   BMI 32.91 kg/m    Gen: Well-appearing, NAD  FH: 37  FHT: 136      A/P:  Caryl Andres is a 33 year old  at 37w5d   by LMP c/w 9w5d US, here for new OB visit and thigh lesion removal.   1. Hx of  delivery at 36w2d for mono/di twins  2. Hx of gestational HTN: baseline HELLP labs. States she is taking ASA intermittently.   3. Hx of gestational thrombocytopenia- WNL CBC  4. Quit smoking      PNC: We discussed the below recommendations for planning, testing, and add on options as well as detailed any increased risk based on patient history. The subsequent choices and results if any are reflected below.      Prenatal labs WNL, Rh +, Rubella immune, , 3rd Trimester HGB 12.2, GBS Today   Rhogam: Na  Genetics: declines  Imaginw4d dating, WNL anatomy 31%ile   Immunizations: TDAP 24, COVID declined, FLU declined, RSV declined  Delivery Plan: epidural if needed, breastfeeding is able, baby meds   BC after delivery: considering- maybe mirena   Delivery Hx: pelvic proven to 8lbs 3 oz       RTC 1 week      Katerin ORTEGA, ANUJAP-C  2024 10:53 AM

## 2024-08-08 NOTE — NURSING NOTE
"Chief Complaint   Patient presents with    Prenatal Care       Initial /80   Pulse 110   Temp 97.1  F (36.2  C) (Tympanic)   Resp 16   Wt 95.3 kg (210 lb 1.6 oz)   LMP 11/18/2023 (Exact Date)   SpO2 98%   BMI 32.91 kg/m   Estimated body mass index is 32.91 kg/m  as calculated from the following:    Height as of 2/15/24: 1.702 m (5' 7\").    Weight as of this encounter: 95.3 kg (210 lb 1.6 oz).  Medication Review: complete    The next two questions are to help us understand your food security.  If you are feeling you need any assistance in this area, we have resources available to support you today.          8/8/2024   SDOH- Food Insecurity   Within the past 12 months, did you worry that your food would run out before you got money to buy more? N   Within the past 12 months, did the food you bought just not last and you didn t have money to get more? N            Health Care Directive:  Patient does not have a Health Care Directive or Living Will: Discussed advance care planning with patient; however, patient declined at this time.    Fang Ennis CMA      "

## 2024-08-15 ENCOUNTER — PRENATAL OFFICE VISIT (OUTPATIENT)
Dept: OBGYN | Facility: OTHER | Age: 34
End: 2024-08-15
Attending: OBSTETRICS & GYNECOLOGY
Payer: COMMERCIAL

## 2024-08-15 VITALS
WEIGHT: 213.5 LBS | BODY MASS INDEX: 33.44 KG/M2 | HEART RATE: 68 BPM | SYSTOLIC BLOOD PRESSURE: 130 MMHG | DIASTOLIC BLOOD PRESSURE: 72 MMHG

## 2024-08-15 DIAGNOSIS — Z34.93 ENCOUNTER FOR PREGNANCY RELATED EXAMINATION IN THIRD TRIMESTER: Primary | ICD-10-CM

## 2024-08-15 PROCEDURE — 99207 PR OB VISIT-NO CHARGE - GICH ONLY: CPT | Performed by: OBSTETRICS & GYNECOLOGY

## 2024-08-15 ASSESSMENT — PAIN SCALES - GENERAL: PAINLEVEL: NO PAIN (0)

## 2024-08-15 NOTE — NURSING NOTE
"Chief Complaint   Patient presents with    Prenatal Care     38 w 5 d     Pt presents to clinic today for prenatal care 38w 5 d . Pt denies any bleeding, or leakage of fluid at this time. States baby is moving good. Patient denies contractions.    Initial /72   Pulse 68   Wt 96.8 kg (213 lb 8 oz)   LMP 11/18/2023 (Exact Date)   BMI 33.44 kg/m   Estimated body mass index is 33.44 kg/m  as calculated from the following:    Height as of 2/15/24: 1.702 m (5' 7\").    Weight as of this encounter: 96.8 kg (213 lb 8 oz).  Medication Reconciliation: complete        Haydee Maldonado LPN    "
vertigo

## 2024-08-15 NOTE — PROGRESS NOTES
Return OB Visit    S: Patient is feeling well. Occasional ctx. No VB or LOF. +FM    O: /72   Pulse 68   Wt 96.8 kg (213 lb 8 oz)   LMP 2023 (Exact Date)   BMI 33.44 kg/m    Gen: Well-appearing, NAD  See OB Flowsheet    A/P:  Caryl Andres is a 33 year old  at 38w5d by LMP c/w 9w5d US, here for return OB visit.  Hx of  delivery at 36w2d for mono/di twins     Hx of gestational HTN:   - normal baseline HELLP labs and UPC   - intermittently taking ASA 81mg     Hx of gestational thrombocytopenia  Quit smoking    Plans breastfeeding, epidural, considering Mirena     PNC: Rh positive, Rubella immune, , GBS positive  Genetics: declined  Imaging: dating US at 9w5d, normal anatomy survey  Immunizations: Tdap 2024. Declines RSV  RTC weekly    Rosa Mehta MD FACOG  OB/GYN  8/15/2024 10:45 AM

## 2024-08-22 ENCOUNTER — PRENATAL OFFICE VISIT (OUTPATIENT)
Dept: OBGYN | Facility: OTHER | Age: 34
End: 2024-08-22
Attending: OBSTETRICS & GYNECOLOGY
Payer: COMMERCIAL

## 2024-08-22 VITALS
BODY MASS INDEX: 33.25 KG/M2 | DIASTOLIC BLOOD PRESSURE: 82 MMHG | WEIGHT: 212.3 LBS | SYSTOLIC BLOOD PRESSURE: 138 MMHG | HEART RATE: 116 BPM

## 2024-08-22 DIAGNOSIS — Z34.93 ENCOUNTER FOR PREGNANCY RELATED EXAMINATION IN THIRD TRIMESTER: Primary | ICD-10-CM

## 2024-08-22 PROCEDURE — 99207 PR OB VISIT-NO CHARGE - GICH ONLY: CPT | Performed by: OBSTETRICS & GYNECOLOGY

## 2024-08-22 ASSESSMENT — PAIN SCALES - GENERAL: PAINLEVEL: NO PAIN (0)

## 2024-08-22 NOTE — PROGRESS NOTES
Return OB Visit    S: Patient is feeling well overall. Has been feeling more pelvic pressure and BH ctx, feels like labor might start soon. No VB or LOF. +FM    O: /82   Pulse 116   Wt 96.3 kg (212 lb 4.8 oz)   LMP 2023 (Exact Date)   BMI 33.25 kg/m    Gen: Well-appearing, NAD  See OB Flowsheet    A/P:  Caryl Andres is a 33 year old  at 39w5d by LMP c/w 9w5d US, here for return OB visit.  Hx of  delivery at 36w2d for mono/di twins     Hx of gestational HTN:   - normal baseline HELLP labs and UPC   - intermittently taking ASA 81mg     Hx of gestational thrombocytopenia  Quit smoking     Plans breastfeeding, epidural, considering Mirena     PNC: Rh positive, Rubella immune, , GBS positive  Genetics: declined  Imaging: dating US at 9w5d, normal anatomy survey  Immunizations: Tdap 2024. Declines RSV  RTC weekly    Rosa Mehta MD FACOG  OB/GYN  2024 10:40 AM

## 2024-08-22 NOTE — NURSING NOTE
Patient presents to clinic for prenatal care 39w5d. Patient denies LOF, VB, and regular ctx.    Charmaine Pizano LPN on 8/22/2024 at 10:46 AM

## 2024-08-25 ENCOUNTER — ANESTHESIA EVENT (OUTPATIENT)
Dept: OBGYN | Facility: OTHER | Age: 34
End: 2024-08-25
Payer: COMMERCIAL

## 2024-08-25 ENCOUNTER — ANESTHESIA (OUTPATIENT)
Dept: OBGYN | Facility: OTHER | Age: 34
End: 2024-08-25
Payer: COMMERCIAL

## 2024-08-25 ENCOUNTER — HOSPITAL ENCOUNTER (INPATIENT)
Facility: OTHER | Age: 34
LOS: 2 days | Discharge: HOME | End: 2024-08-27
Attending: STUDENT IN AN ORGANIZED HEALTH CARE EDUCATION/TRAINING PROGRAM | Admitting: STUDENT IN AN ORGANIZED HEALTH CARE EDUCATION/TRAINING PROGRAM
Payer: COMMERCIAL

## 2024-08-25 PROBLEM — Z36.89 ENCOUNTER FOR TRIAGE IN PREGNANT PATIENT: Status: ACTIVE | Noted: 2024-08-25

## 2024-08-25 PROBLEM — Z34.90 PREGNANCY: Status: ACTIVE | Noted: 2024-08-25

## 2024-08-25 LAB
ABO/RH(D): NORMAL
ALBUMIN MFR UR ELPH: <6 MG/DL
ALBUMIN SERPL BCG-MCNC: 3.3 G/DL (ref 3.5–5.2)
ALP SERPL-CCNC: 119 U/L (ref 40–150)
ALT SERPL W P-5'-P-CCNC: 18 U/L (ref 0–50)
ANION GAP SERPL CALCULATED.3IONS-SCNC: 12 MMOL/L (ref 7–15)
ANTIBODY SCREEN: NEGATIVE
AST SERPL W P-5'-P-CCNC: 23 U/L (ref 0–45)
BILIRUB SERPL-MCNC: 0.2 MG/DL
BUN SERPL-MCNC: 10.7 MG/DL (ref 6–20)
CALCIUM SERPL-MCNC: 8.9 MG/DL (ref 8.8–10.4)
CHLORIDE SERPL-SCNC: 103 MMOL/L (ref 98–107)
CREAT SERPL-MCNC: 0.58 MG/DL (ref 0.51–0.95)
CREAT UR-MCNC: 28.8 MG/DL
EGFRCR SERPLBLD CKD-EPI 2021: >90 ML/MIN/1.73M2
ERYTHROCYTE [DISTWIDTH] IN BLOOD BY AUTOMATED COUNT: 13 % (ref 10–15)
GLUCOSE SERPL-MCNC: 117 MG/DL (ref 70–99)
HCO3 SERPL-SCNC: 21 MMOL/L (ref 22–29)
HCT VFR BLD AUTO: 37.8 % (ref 35–47)
HGB BLD-MCNC: 13 G/DL (ref 11.7–15.7)
MCH RBC QN AUTO: 32.7 PG (ref 26.5–33)
MCHC RBC AUTO-ENTMCNC: 34.4 G/DL (ref 31.5–36.5)
MCV RBC AUTO: 95 FL (ref 78–100)
PLATELET # BLD AUTO: 167 10E3/UL (ref 150–450)
POTASSIUM SERPL-SCNC: 3.7 MMOL/L (ref 3.4–5.3)
PROT SERPL-MCNC: 5.6 G/DL (ref 6.4–8.3)
PROT/CREAT 24H UR: NORMAL MG/G{CREAT}
RBC # BLD AUTO: 3.97 10E6/UL (ref 3.8–5.2)
SODIUM SERPL-SCNC: 136 MMOL/L (ref 135–145)
SPECIMEN EXPIRATION DATE: NORMAL
WBC # BLD AUTO: 9 10E3/UL (ref 4–11)

## 2024-08-25 PROCEDURE — 59400 OBSTETRICAL CARE: CPT | Performed by: NURSE ANESTHETIST, CERTIFIED REGISTERED

## 2024-08-25 PROCEDURE — 86780 TREPONEMA PALLIDUM: CPT | Performed by: STUDENT IN AN ORGANIZED HEALTH CARE EDUCATION/TRAINING PROGRAM

## 2024-08-25 PROCEDURE — 370N000003 HC ANESTHESIA WARD SERVICE

## 2024-08-25 PROCEDURE — 10907ZC DRAINAGE OF AMNIOTIC FLUID, THERAPEUTIC FROM PRODUCTS OF CONCEPTION, VIA NATURAL OR ARTIFICIAL OPENING: ICD-10-PCS | Performed by: STUDENT IN AN ORGANIZED HEALTH CARE EDUCATION/TRAINING PROGRAM

## 2024-08-25 PROCEDURE — 120N000001 HC R&B MED SURG/OB

## 2024-08-25 PROCEDURE — 258N000003 HC RX IP 258 OP 636: Performed by: STUDENT IN AN ORGANIZED HEALTH CARE EDUCATION/TRAINING PROGRAM

## 2024-08-25 PROCEDURE — 59400 OBSTETRICAL CARE: CPT | Performed by: STUDENT IN AN ORGANIZED HEALTH CARE EDUCATION/TRAINING PROGRAM

## 2024-08-25 PROCEDURE — 85027 COMPLETE CBC AUTOMATED: CPT | Performed by: STUDENT IN AN ORGANIZED HEALTH CARE EDUCATION/TRAINING PROGRAM

## 2024-08-25 PROCEDURE — 84156 ASSAY OF PROTEIN URINE: CPT | Performed by: STUDENT IN AN ORGANIZED HEALTH CARE EDUCATION/TRAINING PROGRAM

## 2024-08-25 PROCEDURE — 250N000011 HC RX IP 250 OP 636: Performed by: NURSE ANESTHETIST, CERTIFIED REGISTERED

## 2024-08-25 PROCEDURE — 80053 COMPREHEN METABOLIC PANEL: CPT | Performed by: STUDENT IN AN ORGANIZED HEALTH CARE EDUCATION/TRAINING PROGRAM

## 2024-08-25 PROCEDURE — 250N000009 HC RX 250: Performed by: NURSE ANESTHETIST, CERTIFIED REGISTERED

## 2024-08-25 PROCEDURE — 86900 BLOOD TYPING SEROLOGIC ABO: CPT | Performed by: STUDENT IN AN ORGANIZED HEALTH CARE EDUCATION/TRAINING PROGRAM

## 2024-08-25 PROCEDURE — 250N000011 HC RX IP 250 OP 636: Performed by: STUDENT IN AN ORGANIZED HEALTH CARE EDUCATION/TRAINING PROGRAM

## 2024-08-25 RX ORDER — NALOXONE HYDROCHLORIDE 0.4 MG/ML
0.4 INJECTION, SOLUTION INTRAMUSCULAR; INTRAVENOUS; SUBCUTANEOUS
Status: DISCONTINUED | OUTPATIENT
Start: 2024-08-25 | End: 2024-08-25 | Stop reason: HOSPADM

## 2024-08-25 RX ORDER — ONDANSETRON 4 MG/1
4 TABLET, ORALLY DISINTEGRATING ORAL EVERY 6 HOURS PRN
Status: DISCONTINUED | OUTPATIENT
Start: 2024-08-25 | End: 2024-08-25 | Stop reason: HOSPADM

## 2024-08-25 RX ORDER — METHYLERGONOVINE MALEATE 0.2 MG/ML
200 INJECTION INTRAVENOUS
Status: DISCONTINUED | OUTPATIENT
Start: 2024-08-25 | End: 2024-08-27 | Stop reason: HOSPADM

## 2024-08-25 RX ORDER — OXYTOCIN 10 [USP'U]/ML
10 INJECTION, SOLUTION INTRAMUSCULAR; INTRAVENOUS
Status: DISCONTINUED | OUTPATIENT
Start: 2024-08-25 | End: 2024-08-27 | Stop reason: HOSPADM

## 2024-08-25 RX ORDER — ONDANSETRON 2 MG/ML
4 INJECTION INTRAMUSCULAR; INTRAVENOUS EVERY 6 HOURS PRN
Status: DISCONTINUED | OUTPATIENT
Start: 2024-08-25 | End: 2024-08-25 | Stop reason: HOSPADM

## 2024-08-25 RX ORDER — OXYTOCIN/0.9 % SODIUM CHLORIDE 30/500 ML
340 PLASTIC BAG, INJECTION (ML) INTRAVENOUS CONTINUOUS PRN
Status: DISCONTINUED | OUTPATIENT
Start: 2024-08-25 | End: 2024-08-27 | Stop reason: HOSPADM

## 2024-08-25 RX ORDER — TRANEXAMIC ACID 10 MG/ML
1 INJECTION, SOLUTION INTRAVENOUS EVERY 30 MIN PRN
Status: DISCONTINUED | OUTPATIENT
Start: 2024-08-25 | End: 2024-08-25 | Stop reason: HOSPADM

## 2024-08-25 RX ORDER — METOCLOPRAMIDE 10 MG/1
10 TABLET ORAL EVERY 6 HOURS PRN
Status: DISCONTINUED | OUTPATIENT
Start: 2024-08-25 | End: 2024-08-25 | Stop reason: HOSPADM

## 2024-08-25 RX ORDER — MODIFIED LANOLIN
OINTMENT (GRAM) TOPICAL
Status: DISCONTINUED | OUTPATIENT
Start: 2024-08-25 | End: 2024-08-27 | Stop reason: HOSPADM

## 2024-08-25 RX ORDER — LOPERAMIDE HCL 2 MG
2 CAPSULE ORAL
Status: DISCONTINUED | OUTPATIENT
Start: 2024-08-25 | End: 2024-08-25 | Stop reason: HOSPADM

## 2024-08-25 RX ORDER — LOPERAMIDE HCL 2 MG
2 CAPSULE ORAL
Status: DISCONTINUED | OUTPATIENT
Start: 2024-08-25 | End: 2024-08-27 | Stop reason: HOSPADM

## 2024-08-25 RX ORDER — IBUPROFEN 400 MG/1
800 TABLET, FILM COATED ORAL
Status: COMPLETED | OUTPATIENT
Start: 2024-08-25 | End: 2024-08-26

## 2024-08-25 RX ORDER — NALBUPHINE HYDROCHLORIDE 10 MG/ML
2.5-5 INJECTION, SOLUTION INTRAMUSCULAR; INTRAVENOUS; SUBCUTANEOUS EVERY 6 HOURS PRN
Status: DISCONTINUED | OUTPATIENT
Start: 2024-08-25 | End: 2024-08-26

## 2024-08-25 RX ORDER — TRANEXAMIC ACID 10 MG/ML
1 INJECTION, SOLUTION INTRAVENOUS EVERY 30 MIN PRN
Status: DISCONTINUED | OUTPATIENT
Start: 2024-08-25 | End: 2024-08-27 | Stop reason: HOSPADM

## 2024-08-25 RX ORDER — PROCHLORPERAZINE 25 MG
25 SUPPOSITORY, RECTAL RECTAL EVERY 12 HOURS PRN
Status: DISCONTINUED | OUTPATIENT
Start: 2024-08-25 | End: 2024-08-25 | Stop reason: HOSPADM

## 2024-08-25 RX ORDER — LIDOCAINE HYDROCHLORIDE AND EPINEPHRINE 15; 5 MG/ML; UG/ML
3 INJECTION, SOLUTION EPIDURAL
Status: DISCONTINUED | OUTPATIENT
Start: 2024-08-25 | End: 2024-08-25 | Stop reason: HOSPADM

## 2024-08-25 RX ORDER — METOCLOPRAMIDE HYDROCHLORIDE 5 MG/ML
10 INJECTION INTRAMUSCULAR; INTRAVENOUS EVERY 6 HOURS PRN
Status: DISCONTINUED | OUTPATIENT
Start: 2024-08-25 | End: 2024-08-25 | Stop reason: HOSPADM

## 2024-08-25 RX ORDER — CARBOPROST TROMETHAMINE 250 UG/ML
250 INJECTION, SOLUTION INTRAMUSCULAR
Status: DISCONTINUED | OUTPATIENT
Start: 2024-08-25 | End: 2024-08-25 | Stop reason: HOSPADM

## 2024-08-25 RX ORDER — PENICILLIN G POTASSIUM 5000000 [IU]/1
5 INJECTION, POWDER, FOR SOLUTION INTRAMUSCULAR; INTRAVENOUS ONCE
Status: COMPLETED | OUTPATIENT
Start: 2024-08-25 | End: 2024-08-25

## 2024-08-25 RX ORDER — IBUPROFEN 400 MG/1
800 TABLET, FILM COATED ORAL EVERY 6 HOURS PRN
Status: DISCONTINUED | OUTPATIENT
Start: 2024-08-25 | End: 2024-08-27 | Stop reason: HOSPADM

## 2024-08-25 RX ORDER — FENTANYL CITRATE-0.9 % NACL/PF 10 MCG/ML
100 PLASTIC BAG, INJECTION (ML) INTRAVENOUS EVERY 5 MIN PRN
Status: DISCONTINUED | OUTPATIENT
Start: 2024-08-25 | End: 2024-08-25 | Stop reason: HOSPADM

## 2024-08-25 RX ORDER — BUPIVACAINE HYDROCHLORIDE 2.5 MG/ML
10 INJECTION, SOLUTION EPIDURAL; INFILTRATION; INTRACAUDAL ONCE
Status: COMPLETED | OUTPATIENT
Start: 2024-08-25 | End: 2024-08-25

## 2024-08-25 RX ORDER — METHYLERGONOVINE MALEATE 0.2 MG/ML
200 INJECTION INTRAVENOUS
Status: DISCONTINUED | OUTPATIENT
Start: 2024-08-25 | End: 2024-08-25 | Stop reason: HOSPADM

## 2024-08-25 RX ORDER — LIDOCAINE 40 MG/G
CREAM TOPICAL
Status: DISCONTINUED | OUTPATIENT
Start: 2024-08-25 | End: 2024-08-25 | Stop reason: HOSPADM

## 2024-08-25 RX ORDER — BISACODYL 10 MG
10 SUPPOSITORY, RECTAL RECTAL DAILY PRN
Status: DISCONTINUED | OUTPATIENT
Start: 2024-08-25 | End: 2024-08-27 | Stop reason: HOSPADM

## 2024-08-25 RX ORDER — OXYTOCIN/0.9 % SODIUM CHLORIDE 30/500 ML
100-340 PLASTIC BAG, INJECTION (ML) INTRAVENOUS CONTINUOUS PRN
Status: DISCONTINUED | OUTPATIENT
Start: 2024-08-25 | End: 2024-08-26

## 2024-08-25 RX ORDER — NALOXONE HYDROCHLORIDE 0.4 MG/ML
0.2 INJECTION, SOLUTION INTRAMUSCULAR; INTRAVENOUS; SUBCUTANEOUS
Status: DISCONTINUED | OUTPATIENT
Start: 2024-08-25 | End: 2024-08-25 | Stop reason: HOSPADM

## 2024-08-25 RX ORDER — KETOROLAC TROMETHAMINE 30 MG/ML
30 INJECTION, SOLUTION INTRAMUSCULAR; INTRAVENOUS
Status: COMPLETED | OUTPATIENT
Start: 2024-08-25 | End: 2024-08-26

## 2024-08-25 RX ORDER — PROCHLORPERAZINE MALEATE 10 MG
10 TABLET ORAL EVERY 6 HOURS PRN
Status: DISCONTINUED | OUTPATIENT
Start: 2024-08-25 | End: 2024-08-25 | Stop reason: HOSPADM

## 2024-08-25 RX ORDER — LIDOCAINE HYDROCHLORIDE 10 MG/ML
INJECTION, SOLUTION EPIDURAL; INFILTRATION; INTRACAUDAL; PERINEURAL PRN
Status: DISCONTINUED | OUTPATIENT
Start: 2024-08-25 | End: 2024-08-25

## 2024-08-25 RX ORDER — LIDOCAINE HYDROCHLORIDE AND EPINEPHRINE 15; 5 MG/ML; UG/ML
INJECTION, SOLUTION EPIDURAL PRN
Status: DISCONTINUED | OUTPATIENT
Start: 2024-08-25 | End: 2024-08-25

## 2024-08-25 RX ORDER — SODIUM CHLORIDE, SODIUM LACTATE, POTASSIUM CHLORIDE, CALCIUM CHLORIDE 600; 310; 30; 20 MG/100ML; MG/100ML; MG/100ML; MG/100ML
INJECTION, SOLUTION INTRAVENOUS CONTINUOUS PRN
Status: DISCONTINUED | OUTPATIENT
Start: 2024-08-25 | End: 2024-08-25 | Stop reason: HOSPADM

## 2024-08-25 RX ORDER — OXYTOCIN 10 [USP'U]/ML
10 INJECTION, SOLUTION INTRAMUSCULAR; INTRAVENOUS
Status: DISCONTINUED | OUTPATIENT
Start: 2024-08-25 | End: 2024-08-26

## 2024-08-25 RX ORDER — LOPERAMIDE HCL 2 MG
4 CAPSULE ORAL
Status: DISCONTINUED | OUTPATIENT
Start: 2024-08-25 | End: 2024-08-27 | Stop reason: HOSPADM

## 2024-08-25 RX ORDER — ACETAMINOPHEN 325 MG/1
650 TABLET ORAL EVERY 4 HOURS PRN
Status: DISCONTINUED | OUTPATIENT
Start: 2024-08-25 | End: 2024-08-27 | Stop reason: HOSPADM

## 2024-08-25 RX ORDER — DOCUSATE SODIUM 100 MG/1
100 CAPSULE, LIQUID FILLED ORAL 2 TIMES DAILY PRN
Status: DISCONTINUED | OUTPATIENT
Start: 2024-08-25 | End: 2024-08-27 | Stop reason: HOSPADM

## 2024-08-25 RX ORDER — OXYTOCIN/0.9 % SODIUM CHLORIDE 30/500 ML
1-24 PLASTIC BAG, INJECTION (ML) INTRAVENOUS CONTINUOUS
Status: DISCONTINUED | OUTPATIENT
Start: 2024-08-25 | End: 2024-08-25 | Stop reason: HOSPADM

## 2024-08-25 RX ORDER — MISOPROSTOL 100 UG/1
400 TABLET ORAL
Status: DISCONTINUED | OUTPATIENT
Start: 2024-08-25 | End: 2024-08-25 | Stop reason: HOSPADM

## 2024-08-25 RX ORDER — LIDOCAINE 40 MG/G
CREAM TOPICAL
Status: DISCONTINUED | OUTPATIENT
Start: 2024-08-25 | End: 2024-08-26

## 2024-08-25 RX ORDER — PENICILLIN G 3000000 [IU]/50ML
3 INJECTION, SOLUTION INTRAVENOUS EVERY 4 HOURS
Status: DISCONTINUED | OUTPATIENT
Start: 2024-08-25 | End: 2024-08-25 | Stop reason: HOSPADM

## 2024-08-25 RX ORDER — TERBUTALINE SULFATE 1 MG/ML
0.25 INJECTION, SOLUTION SUBCUTANEOUS
Status: DISCONTINUED | OUTPATIENT
Start: 2024-08-25 | End: 2024-08-25 | Stop reason: HOSPADM

## 2024-08-25 RX ORDER — CARBOPROST TROMETHAMINE 250 UG/ML
250 INJECTION, SOLUTION INTRAMUSCULAR
Status: DISCONTINUED | OUTPATIENT
Start: 2024-08-25 | End: 2024-08-27 | Stop reason: HOSPADM

## 2024-08-25 RX ORDER — HYDROCORTISONE 25 MG/G
CREAM TOPICAL 3 TIMES DAILY PRN
Status: DISCONTINUED | OUTPATIENT
Start: 2024-08-25 | End: 2024-08-27 | Stop reason: HOSPADM

## 2024-08-25 RX ORDER — LOPERAMIDE HCL 2 MG
4 CAPSULE ORAL
Status: DISCONTINUED | OUTPATIENT
Start: 2024-08-25 | End: 2024-08-25 | Stop reason: HOSPADM

## 2024-08-25 RX ORDER — CITRIC ACID/SODIUM CITRATE 334-500MG
30 SOLUTION, ORAL ORAL
Status: DISCONTINUED | OUTPATIENT
Start: 2024-08-25 | End: 2024-08-25 | Stop reason: HOSPADM

## 2024-08-25 RX ORDER — OXYTOCIN 10 [USP'U]/ML
10 INJECTION, SOLUTION INTRAMUSCULAR; INTRAVENOUS
Status: DISCONTINUED | OUTPATIENT
Start: 2024-08-25 | End: 2024-08-25 | Stop reason: HOSPADM

## 2024-08-25 RX ORDER — OXYTOCIN/0.9 % SODIUM CHLORIDE 30/500 ML
340 PLASTIC BAG, INJECTION (ML) INTRAVENOUS CONTINUOUS PRN
Status: DISCONTINUED | OUTPATIENT
Start: 2024-08-25 | End: 2024-08-25 | Stop reason: HOSPADM

## 2024-08-25 RX ORDER — MISOPROSTOL 100 UG/1
400 TABLET ORAL
Status: DISCONTINUED | OUTPATIENT
Start: 2024-08-25 | End: 2024-08-27 | Stop reason: HOSPADM

## 2024-08-25 RX ADMIN — BUPIVACAINE HYDROCHLORIDE 10 ML: 2.5 INJECTION, SOLUTION EPIDURAL; INFILTRATION; INTRACAUDAL; PERINEURAL at 18:38

## 2024-08-25 RX ADMIN — LIDOCAINE HYDROCHLORIDE AND EPINEPHRINE 3 ML: 15; 5 INJECTION, SOLUTION EPIDURAL at 18:34

## 2024-08-25 RX ADMIN — Medication 10 ML/HR: at 18:39

## 2024-08-25 RX ADMIN — ONDANSETRON 4 MG: 2 INJECTION INTRAMUSCULAR; INTRAVENOUS at 19:45

## 2024-08-25 RX ADMIN — PENICILLIN G POTASSIUM 5 MILLION UNITS: 5000000 INJECTION, POWDER, FOR SOLUTION INTRAMUSCULAR; INTRAVENOUS at 17:44

## 2024-08-25 RX ADMIN — SODIUM CHLORIDE, POTASSIUM CHLORIDE, SODIUM LACTATE AND CALCIUM CHLORIDE 1000 ML: 600; 310; 30; 20 INJECTION, SOLUTION INTRAVENOUS at 19:23

## 2024-08-25 RX ADMIN — SODIUM CHLORIDE, POTASSIUM CHLORIDE, SODIUM LACTATE AND CALCIUM CHLORIDE 1000 ML: 600; 310; 30; 20 INJECTION, SOLUTION INTRAVENOUS at 17:47

## 2024-08-25 RX ADMIN — PENICILLIN G 3 MILLION UNITS: 3000000 INJECTION, SOLUTION INTRAVENOUS at 21:36

## 2024-08-25 RX ADMIN — LIDOCAINE HYDROCHLORIDE 3 ML: 10 INJECTION, SOLUTION EPIDURAL; INFILTRATION; INTRACAUDAL; PERINEURAL at 18:30

## 2024-08-25 ASSESSMENT — ACTIVITIES OF DAILY LIVING (ADL)
ADLS_ACUITY_SCORE: 20
FALL_HISTORY_WITHIN_LAST_SIX_MONTHS: NO
WALKING_OR_CLIMBING_STAIRS_DIFFICULTY: NO
ADLS_ACUITY_SCORE: 20
TOILETING_ISSUES: NO
ADLS_ACUITY_SCORE: 20
DOING_ERRANDS_INDEPENDENTLY_DIFFICULTY: NO
ADLS_ACUITY_SCORE: 20
DRESSING/BATHING_DIFFICULTY: NO

## 2024-08-25 NOTE — ANESTHESIA PROCEDURE NOTES
"Epidural catheter Procedure Note    Pre-Procedure   Staff -        CRNA: Eric Morrison APRN CRNA       Performed By: CRNA       Location: OB       Procedure Start/Stop Times: 8/25/2024 6:25 PM and 8/25/2024 6:40 PM       Pre-Anesthestic Checklist: patient identified, IV checked, risks and benefits discussed, informed consent, monitors and equipment checked, pre-op evaluation and at physician/surgeon's request  Timeout:       Correct Patient: Yes        Correct Procedure: Yes        Correct Site: Yes        Correct Position: Yes   Procedure Documentation  Procedure: epidural catheter       Diagnosis: Labor Pain       Patient Position: sitting       Patient Prep/Sterile Barriers: sterile gloves, mask, patient draped       Skin prep: Chloraprep       Local skin infiltrated with 3 mL of.        Insertion Site: L3-4. (midline approach).       Technique: LORT air        TASHIA at 5 cm.       Needle Type: ToPF Changsy needle       Needle Gauge: 17.        Needle Length (Inches): 3.5        Catheter: 19 G.          Catheter threaded easily.         6 cm epidural space.         Threaded 11 cm at skin.         # of attempts: 1 and  # of redirects:  1    Assessment/Narrative         Paresthesias: No.       Test dose of 3 mL lidocaine 1.5% w/ 1:200,000 epinephrine at 18:34 CDT.         Test dose negative, 3 minutes after injection, for signs of intravascular, subdural, or intrathecal injection.       Insertion/Infusion Method: LORT air       Aspiration negative for Heme or CSF via Epidural Catheter.    Medication(s) Administered   Medication Administration Time: 8/25/2024 6:25 PM     Comments:  Pt tolerated procedure well.  No complications encountered.       FOR [facility] [place] [place]:   Pain Team Contact information: please page the Pain Team Via [employer]. Search \"Pain\". During daytime hours, please page the attending first. At night please page the resident first.      "

## 2024-08-25 NOTE — PROGRESS NOTES
Patient arrives with significant other for complaints of contractions since 1440 that have been getting closer together. Admitted to room 404.    Bassam Du RN on 8/25/2024 at 5:09 PM

## 2024-08-25 NOTE — ANESTHESIA PREPROCEDURE EVALUATION
Anesthesia Pre-Procedure Evaluation    Patient: Caryl Andres   MRN: [id number] : 1990        Procedure :           Past Medical History:   Diagnosis Date    Closed fracture of shaft of humerus     No Comments Provided    Kidney stone     Varicella       Past Surgical History:   Procedure Laterality Date    ELBOW SURGERY      Fracture repair ?pinning      No Known Allergies   Social History     Tobacco Use    Smoking status: Former     Current packs/day: 0.00     Types: Cigarettes     Passive exposure: Past    Smokeless tobacco: Never   Substance Use Topics    Alcohol use: Not Currently     Comment: Alcoholic Drinks/day: rare      Wt Readings from Last 1 Encounters:   24 96.3 kg (212 lb 4.8 oz)        Anesthesia Evaluation   Pt has had prior anesthetic.         ROS/MED HX  ENT/Pulmonary:  - neg pulmonary ROS     Neurologic:       Cardiovascular:  - neg cardiovascular ROS     METS/Exercise Tolerance: >4 METS    Hematologic:  - neg hematologic  ROS     Musculoskeletal:  - neg musculoskeletal ROS (+)       scoliosis,        GI/Hepatic:     (+) GERD,                   Renal/Genitourinary:     (+)       Nephrolithiasis ,       Endo:  - neg endo ROS     Psychiatric/Substance Use:  - neg psychiatric ROS     Infectious Disease:       Malignancy:  - neg malignancy ROS     Other:  - neg other ROS          Physical Exam    Airway        Mallampati: I   TM distance: > 3 FB   Neck ROM: full   Mouth opening: > 3 cm    Respiratory Devices and Support         Dental  no notable dental history     (+) Completely normal teeth      Cardiovascular   cardiovascular exam normal       Rhythm and rate: regular and normal     Pulmonary   pulmonary exam normal        breath sounds clear to auscultation           OUTSIDE LABS:  CBC:   Lab Results   Component Value Date    WBC 9.0 2024    WBC 8.8 2024    HGB 13.0 2024    HGB 12.2 2024    HCT 37.8 2024    HCT 35.8 2024     2024  "    05/30/2024     BMP:   Lab Results   Component Value Date     08/25/2024     01/25/2024    POTASSIUM 3.7 08/25/2024    POTASSIUM 3.9 01/25/2024    CHLORIDE 103 08/25/2024    CHLORIDE 102 01/25/2024    CO2 21 (L) 08/25/2024    CO2 23 01/25/2024    BUN 10.7 08/25/2024    BUN 8.9 01/25/2024    CR 0.58 08/25/2024    CR 0.55 01/25/2024     (H) 08/25/2024    GLC 81 01/25/2024     COAGS: No results found for: \"PTT\", \"INR\", \"FIBR\"  POC:   Lab Results   Component Value Date    HCG Negative 08/18/2017     HEPATIC:   Lab Results   Component Value Date    ALBUMIN 3.3 (L) 08/25/2024    PROTTOTAL 5.6 (L) 08/25/2024    ALT 18 08/25/2024    AST 23 08/25/2024    ALKPHOS 119 08/25/2024    BILITOTAL 0.2 08/25/2024    BILIDIRECT 0.00 (L) 09/04/2014     OTHER:   Lab Results   Component Value Date    CHRISTIE 8.9 08/25/2024       Anesthesia Plan    ASA Status:  2       Anesthesia Type: Epidural.              Consents    Anesthesia Plan(s) and associated risks, benefits, and realistic alternatives discussed. Questions answered and patient/representative(s) expressed understanding.     - Discussed: Risks, Benefits and Alternatives for BOTH SEDATION and the PROCEDURE were discussed     - Discussed with:  Patient, Spouse            Postoperative Care            Comments:           neg OB ROS.      SHANNON Munoz CRNA    I have reviewed the pertinent notes and labs in the chart from the past 30 days and (re)examined the patient.  Any updates or changes from those notes are reflected in this note.          # Hypoalbuminemia: Lowest albumin = 3.3 g/dL at 8/25/2024  5:41 PM, will monitor as appropriate    # Drug Induced Platelet Defect: home medication list includes an antiplatelet medication      "

## 2024-08-25 NOTE — PROGRESS NOTES
Notified Dr. Solorzano of patient arrival. SVE: 4/90. Patient would like epidural as soon as possible. Dr. Solorzano to place admission orders.    Bassam Du RN     yes

## 2024-08-25 NOTE — H&P
OB History and Physical      Name: Caryl Andres MRN#: [id number]   Age: 33 year old YOB: 1990      Assessment and Plan:   Caryl Andres, 33 year old  at 40w1d by LMP c/w 9w4d US, presents for spontaneous labor.    Spontaneous labor  -GBS positive: PCN loading dose given at 1744.   -Planning for epidural  -Plan to check cervix and AROM once GBS is adequately treated.    Elevated BP noted upon admission  - Serial BP monitoring   - Admission preE labs ordered.    PNC  -A positive. Rubella immune  -Plan to breast feed     FWB: Cat II tracing due to minimal variability. Receiving IV fluid bolus for epidural for now. Continuous Fetal Monitoring    Jeanine Solorzano MD  Obstetrics and Gynecology  2024 6:14 PM     HPI:     Notes normal fetal movement. Lost her mucous plug yesterday. Denied vaginal bleeding or gush of fluid. Contractions became painful and regular about 1440.       ROS:   Complete 10-point ROS negative except as noted in HPI.       OB History:     Pregnancy Complications:  -GBS positive status  -H/o  delivery with mono-di twins, vaginal deliveries  OB History    Para Term  AB Living   4 2 1 1 1 3   SAB IAB Ectopic Multiple Live Births   1 0 0 1 3      # Outcome Date GA Lbr Sergio/2nd Weight Sex Type Anes PTL Lv   4 Current            3A  10/19/14 36w2d   F    VIGNESH      Name: Marianela Barger   3B  10/19/14 36w2d   F    VIGNESH      Name: Bacilio Barger   2 Term 12 38w0d  3.714 kg (8 lb 3 oz) F Vag-Spont EPI  VIGNESH      Name: Markel Barger   1 SAB 11                  Past Medical History:   Denied personal history of asthma and chronic hypertension.  Past Medical History:   Diagnosis Date    Closed fracture of shaft of humerus     No Comments Provided    Excessive and frequent menstruation with regular cycle     No Comments Provided    Kidney stone     Personal history of other medical treatment (CODE)     G2, P1, SAB1 (7 weeks)     Varicella           Past Surgical History:   Denied personal history of abdominal surgical history.  Past Surgical History:   Procedure Laterality Date    ELBOW SURGERY      Fracture repair ?pinning          Social History:     Social History     Tobacco Use    Smoking status: Former     Current packs/day: 0.00     Types: Cigarettes     Passive exposure: Past    Smokeless tobacco: Never   Substance Use Topics    Alcohol use: Not Currently     Comment: Alcoholic Drinks/day: rare          Family History:     Family History   Problem Relation Age of Onset    Diabetes Type 2  Mother     Hypertension Mother     Hypertension Father     No Known Problems Sister           Immunizations:     Immunization History   Administered Date(s) Administered    DTAP (<7y) 1990, 01/23/1991, 03/20/1991, 03/09/1992, 03/15/1995    HIB(PRP-OMP)(PedvaxHIB) 09/18/1991    Hepatitis B, Peds 05/08/2003, 08/12/2003, 08/20/2004    Influenza (IIV3) PF 11/15/2011    Influenza Vaccine >6 months,quad, PF 09/04/2014    MMR 01/31/1992, 05/08/2003    OPV, trivalent, live 01/23/1991, 03/09/1992, 03/15/1995    TDAP (Adacel,Boostrix) 05/30/2024    TDAP Vaccine (Boostrix) 07/26/2012, 10/21/2014    Td (Adult), Adsorbed 05/08/2003          Allergies:   No Known Allergies       Medications:     Medications Prior to Admission   Medication Sig Dispense Refill Last Dose    Prenatal Vit-Fe Fumarate-FA (PRENATAL MULTIVITAMIN  PLUS IRON) 27-1 MG TABS Take 1 tablet by mouth daily   8/24/2024    aspirin 81 MG EC tablet Take 1 tablet (81 mg) by mouth daily 90 tablet 1     Probiotic Product (PROBIOTIC BLEND) CAPS              PE:   Vit: 150s/90s,  while I was in the room   Gen: Well-appearing, NAD, comfortable   CV: Well perfused  Pulm: Normal respiratory efforts   Abd: Soft, gravid, non-tender  Ext: trace LE edema b/l  Cx: 4/90 by RN's exam    Pres:  cephalic by BSUS    FHT: Baseline 150, minimal variability, accelerations +, no decelerations   [place]: 2  contractions in 10 minutes           Labs/Imagings:     Recent Results (from the past 24 hour(s))   CBC with platelets    Collection Time: 08/25/24  5:41 PM   Result Value Ref Range    WBC Count 9.0 4.0 - 11.0 10e3/uL    RBC Count 3.97 3.80 - 5.20 10e6/uL    Hemoglobin 13.0 11.7 - 15.7 g/dL    Hematocrit 37.8 35.0 - 47.0 %    MCV 95 78 - 100 fL    MCH 32.7 26.5 - 33.0 pg    MCHC 34.4 31.5 - 36.5 g/dL    RDW 13.0 10.0 - 15.0 %    Platelet Count 167 150 - 450 10e3/uL   Adult Type and Screen    Collection Time: 08/25/24  5:41 PM   Result Value Ref Range    ABO/RH(D) A POS     SPECIMEN EXPIRATION DATE [id number]

## 2024-08-26 LAB — HGB BLD-MCNC: 12 G/DL (ref 11.7–15.7)

## 2024-08-26 PROCEDURE — 722N000001 HC LABOR CARE VAGINAL DELIVERY SINGLE

## 2024-08-26 PROCEDURE — 120N000001 HC R&B MED SURG/OB

## 2024-08-26 PROCEDURE — 250N000013 HC RX MED GY IP 250 OP 250 PS 637: Performed by: STUDENT IN AN ORGANIZED HEALTH CARE EDUCATION/TRAINING PROGRAM

## 2024-08-26 PROCEDURE — 85018 HEMOGLOBIN: CPT | Performed by: STUDENT IN AN ORGANIZED HEALTH CARE EDUCATION/TRAINING PROGRAM

## 2024-08-26 PROCEDURE — 36415 COLL VENOUS BLD VENIPUNCTURE: CPT | Performed by: STUDENT IN AN ORGANIZED HEALTH CARE EDUCATION/TRAINING PROGRAM

## 2024-08-26 RX ADMIN — IBUPROFEN 800 MG: 400 TABLET ORAL at 13:55

## 2024-08-26 RX ADMIN — IBUPROFEN 800 MG: 400 TABLET ORAL at 04:45

## 2024-08-26 RX ADMIN — ACETAMINOPHEN 650 MG: 325 TABLET ORAL at 21:11

## 2024-08-26 RX ADMIN — HYDROCORTISONE 1 G: 25 CREAM TOPICAL at 04:45

## 2024-08-26 ASSESSMENT — ACTIVITIES OF DAILY LIVING (ADL)
ADLS_ACUITY_SCORE: 20

## 2024-08-26 NOTE — PROGRESS NOTES
Up to the bathroom, voided W/O difficulty. Katlyn care discussed and demonstrated, patient verbalizes understanding. Settled in bed, C/O back pain at her epidural site but declines pain RX, warm pack applied to back per patients request. Settled for sleep.

## 2024-08-26 NOTE — PLAN OF CARE
Assessments completed as charted. B/P: 140/62, T: 98.1, P: 98, R: 18. Rates pain: 0/10.  Patient is DTV. Fundus: Midline 2/U. Lochia: Light. Activity: unrestricted with out pain . Infant feeding: Breast feeding going well.           Postpartum breastfeeding assessment completed and education provided, see Patient Education Activity.  Items included in the education are:   proper positioning and latch  effectiveness of feeding  Postpartum care education provided, see Patient Education activity. Patient denies needs. Will monitor.  Ny Jacobson RN

## 2024-08-26 NOTE — ANESTHESIA POSTPROCEDURE EVALUATION
Patient: Caryl Andres    Procedure: * No procedures listed *       Anesthesia Type:  Epidural    Note:  Disposition: Inpatient   Postop Pain Control: Uneventful            Sign Out: Well controlled pain   PONV: No   Neuro/Psych: Uneventful            Sign Out: Acceptable/Baseline neuro status   Airway/Respiratory: Uneventful            Sign Out: Acceptable/Baseline resp. status   CV/Hemodynamics: Uneventful            Sign Out: Acceptable CV status; No obvious hypovolemia; No obvious fluid overload   Other NRE: NONE   DID A NON-ROUTINE EVENT OCCUR? No    Event details/Postop Comments:  Patient rounding following discontinuation of epidural catheter. Patient up ambulating in room, denies headache, fever, and chills. Patient voiding without issues. Patient reports sore back at insertion site, encourage to alternate cold and hot for comfort. Patient is overall very satisfied with epidural services.             Last vitals:  Vitals:    08/26/24 0430 08/26/24 0800 08/26/24 1150   BP: 136/73 135/75 139/68   Pulse:      Resp:  16    Temp: 97.7  F (36.5  C) (!) 96.6  F (35.9  C)    SpO2:  96%        Electronically Signed By: SHANNON Sutton CRNA  August 26, 2024  12:22 PM

## 2024-08-26 NOTE — PROGRESS NOTES
Labor Progress Note    S: Feels well after epidural.     O:  Patient Vitals for the past 24 hrs:   BP Temp Temp src Resp SpO2   24 2100 127/62 98  F (36.7  C) Oral -- 97 %   24 130/65 -- -- -- 98 %   24 123/60 -- -- -- 100 %   24 101/55 -- -- -- 100 %   24 -- -- -- -- 100 %   24 136/68 -- -- -- --   24 -- -- -- -- 100 %   24 136/65 -- -- -- --   24 190 (!) 142/73 -- -- -- 100 %   24 1853 (!) 140/67 -- -- -- --   24 1846 (!) 148/71 -- -- -- --   24 1845 (!) 140/70 98.8  F (37.1  C) Temporal 18 --   24 1840 (!) 149/78 -- -- -- --   24 1830 (!) 146/80 -- -- -- --   24 1725 (!) 150/77 -- -- -- --   24 1715 (!) 158/80 97.3  F (36.3  C) Temporal -- 98 %     Gen: Well-appearing, NAD  Pulm: Breathing comfortably on room air  Abd: Soft, gravid, non-tender  Ext: trace LE edema b/l    Cervix: 5/90/-2     FHT: , moderate variability, + accels, occasional variable decels  [place]ronelle: 2-3 contractions    A/P: Caryl Andres, 33 year old  at 40w1d by LMP c/w 9w4d US, presents for spontaneous labor.     Spontaneous labor  -GBS positive: PCN loading dose given at 1744.   -AROM performed at 2130 w/ small amount of clear fluid. Plan to recheck cervix at ~2330. If minimal cervical change, plan to start pitocin.  -S/p epidural     Newly elevated BPs, not yet 4 hours apart   - Serial BP monitoring   - Admission preE labs returned normal.     PNC  -A positive. Rubella immune  -Plan to breast feed                 FWB: Cat II tracing due to occasional variable decels, but generally reassuring w/ moderate variability and acels. With patient progressing through labor, will continue to monitor. Continuous Fetal Monitoring    Jeanine Solorzano MD  Obstetrics and Gynecology  2024 9:33 PM

## 2024-08-26 NOTE — PLAN OF CARE
Resting in bed, patient states that her legs are slightly warm and tingly and her contractions are less intense. Will continue to monitor.

## 2024-08-26 NOTE — L&D DELIVERY NOTE
Delivery Summary    Caryl Andres MRN# [id number]   Age: 33 year old YOB: 1990     ASSESSMENT & PLAN:   Caryl Andres, 33 year old  at 40w1d by LMP c/w 9w4d US, was admitted for spontaneous labor on 2024. Pregnancy was notable for GBS positive status and newly elevated BPs (not yet meeting criteria for gHTN).     Upon admission her cervix was 4/70. Her GBS positive status was adequately treated with PCN. She received an epidural with good pain relief. She received AROM w/ clear fluid (2130) for labor augmentation. She became complete and began pushing immediately. At 230, a vigorous female infant was delivered in the MOA position with a tight nuchal cord that was delivered through. Spontaneous fetal head restitution to LOT position. Anterior shoulder (RIGHT) delivered without difficulties with only gentle axial traction. Apgar of 9 at 1min and 9 at 5min. Weight pending. Delayed cord clamping was done for >60 seconds. The placenta delivered spontaneously, intact with a 3 vessel cord at 230. Pitocin was given after delivery of the infant. No laceration noted. Fundus firm. QBL of 50mL. Sponge and sharp counts were correct.       Dmitry Female-Caryl [[id number]]      Rupture date/time: 24   Rupture type: Artificial Rupture of Membranes  Fluid color: Clear  Fluid odor: Normal     Augmentation: AROM  Indications for augmentation: Ineffective Contraction Pattern       Delivery/Placenta Date and Time      Delivery Date: 24 Delivery Time: 11:04 PM                 Cord      Cord Complications: Nuchal   Nuchal Intervention: delivered through         Nuchal cord description: tight nuchal cord         Stem cell collection?: No           Labor Events and Shoulder Dystocia    Fetal Tracing Prior to Delivery: Category 2  Fetal Tracing Comments: Moderate variability, late decels  Shoulder dystocia present?: Neg       Delivery (Maternal) (Provider to Complete) ([id number])     Episiotomy: None  Perineal lacerations: None    Repair suture: None  Genital tract inspection done: Pos       Blood Loss  Mother: Caryl Andres #[id number]     Start of Mother's Information      Delivery Blood Loss  08/25/24 1104 - 08/25/24 2316      None                 End of Mother's Information  Mother: Caryl Andres #[id number]                Delivery - Provider to Complete ([id number])    Delivery Type (Choose the 1 that will go to the Birth History): Vaginal, Spontaneous                                           Placenta    Removal: Spontaneous  Disposition: Hospital disposal             Presentation and Position    Presentation: Vertex    Position: Middle Occiput Anterior                   Jeanine Solorzano MD  Obstetrics and Gynecology  08/25/2024 [id number]

## 2024-08-26 NOTE — LACTATION NOTE
This note was copied from a baby's chart.  INPATIENT LACTATION CONSULT      Consult with Caryl and cynthia regarding breastfeeding.  Obvious rooting with a strong latch observed this feeding session.  Rhythmic and aggressive suckling also noted.  Instructed Caryl on correct positioning and technique when latching babe on.  Caryl is independent with latching babe onto breast.  Minimal assistance required.  Encouraged Caryl on the importance of frequent feedings throughout the day (at least 8-12 feedings in a 24 hour period) and skin to skin contact.  Caryl demonstrated and states she understands all information given.    Jaja Figueroa RN, IBCLC  Lactation Consultant  [facility]

## 2024-08-26 NOTE — PROGRESS NOTES
OB/GYN Postpartum Note      S:  Patient is feeling well this morning.  Lochia = menses.  Eating and drinking without nausea.  Ambulating without difficulty.  Pain is well controlled.  Breastfeeding without questions or concerns.      O:   Vitals:    24 0039 24 0104 24 0430 24 0800   BP: 139/63 (!) 140/62 136/73 135/75   BP Location:  Right arm  Left arm   Patient Position:  Semi-Rehman's  Semi-Rehman's   Cuff Size:  Adult Regular  Adult Regular   Pulse:  98     Resp:  18  16   Temp:  98.1  F (36.7  C) 97.7  F (36.5  C)    TempSrc:  Oral  Temporal   SpO2:  97%  96%     General: resting in bed, in NAD  Resp: nonlabored  Abdomen: soft, nontender, nondistended  Fundus firm at umbilicus  Extremities: 1+ edema in BLE    Hemoglobin   Date Value Ref Range Status   2024 12.0 11.7 - 15.7 g/dL Final   10/20/2014 11.4 (L) 12.0 - 16.0 g/dL    ]    A: Ms. Caryl Andres is a 33 year old  PPD #1 s/p     P:  gHTN: BPs mild range upon admission and immediately postpartum. Improved this AM. Will continue to monitor closely. Discussed possibility of starting oral antihypertensive if BPs remain elevated    Heme 13.0 >  > 12.0  GI: tolerating regular diet  Feeding: breast  Contraception: considering Mirena  Rubella Immune  Rh positive  Disposition: routine cares, anticipate discharge in 1-2 days pending BP    Rosa Mehta MD FACOG  OB/GYN  2024 8:19 AM

## 2024-08-27 VITALS
HEART RATE: 98 BPM | WEIGHT: 206.3 LBS | TEMPERATURE: 97.2 F | BODY MASS INDEX: 32.31 KG/M2 | RESPIRATION RATE: 16 BRPM | SYSTOLIC BLOOD PRESSURE: 135 MMHG | OXYGEN SATURATION: 98 % | DIASTOLIC BLOOD PRESSURE: 71 MMHG

## 2024-08-27 PROBLEM — O13.9 GESTATIONAL HYPERTENSION: Status: ACTIVE | Noted: 2024-08-27

## 2024-08-27 LAB — T PALLIDUM AB SER QL: NONREACTIVE

## 2024-08-27 PROCEDURE — 250N000013 HC RX MED GY IP 250 OP 250 PS 637: Performed by: STUDENT IN AN ORGANIZED HEALTH CARE EDUCATION/TRAINING PROGRAM

## 2024-08-27 RX ADMIN — IBUPROFEN 800 MG: 400 TABLET ORAL at 03:47

## 2024-08-27 RX ADMIN — IBUPROFEN 800 MG: 400 TABLET ORAL at 09:43

## 2024-08-27 ASSESSMENT — ACTIVITIES OF DAILY LIVING (ADL)
ADLS_ACUITY_SCORE: 20

## 2024-08-27 NOTE — DISCHARGE INSTRUCTIONS
Warning Signs after Having a Baby    Keep this paper on your fridge or somewhere else where you can see it.    Call your provider if you have any of these symptoms up to 12 weeks after having your baby.    Thoughts of hurting yourself or your baby  Pain in your chest or trouble breathing  Severe headache not helped by pain medicine  Eyesight concerns (blurry vision, seeing spots or flashes of light, other changes to eyesight)  Fainting, shaking or other signs of a seizure    Call 9-1-1 if you feel that it is an emergency.     The symptoms below can happen to anyone after giving birth. They can be very serious. Call your provider if you have any of these warning signs.    My provider s phone number: _______________________    Losing too much blood (hemorrhage)    Call your provider if you soak through a pad in less than an hour or pass blood clots bigger than a golf ball. These may be signs that you are bleeding too much.    Blood clots in the legs or lungs    After you give birth, your body naturally clots its blood to help prevent blood loss. Sometimes this increased clotting can happen in other areas of the body, like the legs or lungs. This can block your blood flow and be very dangerous.     Call your provider if you:  Have a red, swollen spot on the back of your leg that is warm or painful when you touch it.   Are coughing up blood.     Infection    Call your provider if you have any of these symptoms:  Fever of 100.4 F (38 C) or higher.  Pain or redness around your stitches if you had an incision.   Any yellow, white, or green fluid coming from places where you had stitches or surgery.    Mood Problems (postpartum depression)    Many people feel sad or have mood changes after having a baby. But for some people, these mood swings are worse.     Call your provider right away if you feel so anxious or nervous that you can't care for yourself or your baby.    Preeclampsia (high blood pressure)    Even if you  didn't have high blood pressure when you were pregnant, you are at risk for the high blood pressure disease called preeclampsia. This risk can last up to 12 weeks after giving birth.     Call your provider if you have:   Pain on your right side under your rib cage  Sudden swelling in the hands and face    Remember: You know your body. If something doesn't feel right, get medical help.     For informational purposes only. Not to replace the advice of your health care provider. Copyright 2020 [facility]. All rights reserved. Clinically reviewed by Deena Woodruff, RNC-OB, MSN. [employer] [id number] - Rev 02/23.

## 2024-08-27 NOTE — PLAN OF CARE
Caryl Andres is doing well. Vitals are stable: /75   Pulse 98   Temp 97.3  F (36.3  C) (Temporal)   Resp 16   LMP 2023 (Exact Date)   SpO2 97%   Breastfeeding Unknown     FF, midline and 1 above. Bleeding is light with no clots noted. Pain has been well managed with oral analgesics. Voiding without difficulty. Patient IS passing gas. Independent in room. Tolerating a Regular diet and oral rehydration. breastfeeding with minimal assistance. Bonding well with . Patient has verbalized understanding of routine cares and warning signs.

## 2024-08-27 NOTE — DISCHARGE SUMMARY
VAGINAL DELIVERY DISCHARGE SUMMARY    Admit date: 2024  Discharge date: 2024     Admit Dx:   - 33 year old  at 40w1d    - spontaneous labor    Discharge Dx:  - Same as above, s/p   -Gestational hypertension    Procedures:  - Spontaneous vaginal delivery  - Epidural analgesia    Admit HPI:  Ms. Caryl Andres is a   at 40w1d  who was admitted for spontaneous labor on 2024. Please see her admit H&P for full details of her PMH, PSH, Meds, Allergies and exam on admit.    Hospital course:  Caryl Andres was admitted to the hospital on 2024 for the above listed indications. Her labor progressed spontaneously with AROM at 5cm . She delivered a female infant in the OA position. APGARS were 9/9. Weight was 3714g. QBL from the delivery was 244. Please see her Delivery Summary for full details regarding her delivery.    Her postpartum course was complicated by mild range BPs for the first few hours postpartum, then normotensive. On PPD#2, she was meeting all of her postpartum goals and deemed stable for discharge. She was voiding without difficulty, tolerating a regular diet without nausea and vomiting, her pain was well controlled on oral pain medicines and her lochia was appropriate. Her hemoglobin prior to delivery was 13.0 and after delivery was 12.0. Her Rh status was positive, and Rhogam was not indicated.     Discharge Medications:  Discharge Medication List as of 2024 10:55 AM        CONTINUE these medications which have NOT CHANGED    Details   Prenatal Vit-Fe Fumarate-FA (PRENATAL MULTIVITAMIN  PLUS IRON) 27-1 MG TABS Take 1 tablet by mouth daily, Historical      Probiotic Product (PROBIOTIC BLEND) CAPS Historical           STOP taking these medications       aspirin 81 MG EC tablet Comments:   Reason for Stopping:               Discharge/Disposition:  Caryl Andres was discharged to home in stable condition with the following instructions/medications:  1) Call  for temperature > 100.4, bright red vaginal bleeding >1 pad an hour x 2 hours, foul smelling vaginal discharge, pain not controlled by usual oral pain meds, persistent nausea and vomiting not controlled on medications  2) She was considering Mirena for contraception.  3) For feeding she decided to breastfeed.  4) She was instructed to follow-up in 6 weeks for a routine postpartum visit and 3-5 days for a BP check.      Rosa Mehta MD  OBGYN  8/27/2024 8:28 AM

## 2024-08-27 NOTE — PLAN OF CARE
NSG DISCHARGE NOTE    Patient discharged to home at 11:45 AM via ambulation. Accompanied by spouse and staff. Discharge instructions reviewed with patient, opportunity offered to ask questions. Prescriptions - None ordered for discharge. All belongings sent with patient.    Ny Jacobson RN

## 2024-08-27 NOTE — PROGRESS NOTES
OB/GYN Postpartum Note      S:  Patient is feeling well this morning.  Lochia = menses.  Eating and drinking without nausea.  Ambulating without difficulty.  Pain is well controlled.  Breastfeeding without questions or concerns.      O:   Vitals:    24 1510 24 1957 24 0020 24 0350   BP: 129/64 139/77 135/75 138/83   BP Location:       Patient Position:       Cuff Size:       Pulse:       Resp:   16    Temp: 96.9  F (36.1  C) 97  F (36.1  C) 97.3  F (36.3  C)    TempSrc: Temporal  Temporal    SpO2: 97%        General: resting in bed, in NAD  Resp: nonlabored  Abdomen: soft, nontender, nondistended  Fundus firm at umbilicus  Extremities: trace edema in BLE    Hemoglobin   Date Value Ref Range Status   2024 12.0 11.7 - 15.7 g/dL Final   10/20/2014 11.4 (L) 12.0 - 16.0 g/dL    ]    A: Ms. Caryl Andres is a 33 year old  PPD #2 s/p     P:  gHTN: BPs mild range upon admission and immediately postpartum. Has been normotensive since.      Heme 13.0 >  > 12.0  GI: tolerating regular diet  Feeding: breast  Contraception: considering Mirena  Rubella Immune  Rh positive  Disposition: routine cares, anticipate discharge today    Rosa Mehta MD FACOG  OB/GYN  2024 8:27 AM

## 2024-08-27 NOTE — PLAN OF CARE
Assessments completed as charted. B/P: 135/71, T: 97.2, P: 98, R: 16. Rates pain: 3/10 . Voiding without difficulty. Fundus: Midline U/1. Lochia: Light. Activity: unrestricted with out pain . Infant feeding: Breast feeding going well.           Postpartum breastfeeding assessment completed and education provided, see Patient Education Activity.  Items included in the education are:   proper positioning and latch  effectiveness of feeding  sign/symptoms of infant feeding issues requiring referral to qualified health care provider  Postpartum care education provided, see Patient Education activity. Patient denies needs. Will monitor.  Ny Jacobson RN

## 2024-08-29 ENCOUNTER — OFFICE VISIT (OUTPATIENT)
Dept: OBGYN | Facility: OTHER | Age: 34
End: 2024-08-29
Attending: OBSTETRICS & GYNECOLOGY
Payer: COMMERCIAL

## 2024-08-29 VITALS
OXYGEN SATURATION: 98 % | DIASTOLIC BLOOD PRESSURE: 92 MMHG | SYSTOLIC BLOOD PRESSURE: 140 MMHG | HEART RATE: 77 BPM | RESPIRATION RATE: 16 BRPM

## 2024-08-29 PROCEDURE — 99213 OFFICE O/P EST LOW 20 MIN: CPT

## 2024-08-29 PROCEDURE — G0463 HOSPITAL OUTPT CLINIC VISIT: HCPCS

## 2024-08-29 RX ORDER — NIFEDIPINE 30 MG
30 TABLET, EXTENDED RELEASE ORAL DAILY
Qty: 30 TABLET | Refills: 0 | Status: SHIPPED | OUTPATIENT
Start: 2024-08-29

## 2024-08-29 ASSESSMENT — PAIN SCALES - GENERAL: PAINLEVEL: MILD PAIN (2)

## 2024-08-29 NOTE — NURSING NOTE
Chief Complaint   Patient presents with    Blood Pressure Check     Patient presents to clinic for blood pressure check. Patient's at home pressures have been as followed 139/95 on 8/28/24, 137/100 on 8/28/24, 146/98 on 08/29/24.        Shanta Jiang RN 8/29/2024 10:52 AM

## 2024-08-29 NOTE — PROGRESS NOTES
Follow-Up Visit    S: Ms. Caryl Andres is a 33 year old  here for blood pressure check. She has been in the mild rand at home with continuous elevation diastolic. No abnormal symptoms. She is doing well over all.     O:  BP (!) 146/100 (BP Location: Right arm, Patient Position: Right side, Cuff Size: Adult Regular)   Pulse 77   Resp 16   LMP 2023 (Exact Date)   SpO2 98%   Breastfeeding Yes   Gen: Well-appearing, NAD  Pulm: nonlabored        A/P:  Ms. Caryl Andres is a 33 year old  here for pressure check.  In clinic today she is mildly elevated.  Recommend that she start nifedipine 30 mg once daily.  Will have her follow-up in 1 week for recheck.  Would like her to continue to check her blood pressure twice daily at home.  She should notify the clinic if she is above 140 systolic or 90 diastolic.  She voices understanding of these recommendations..  -     RICHMOND Everett-C  2024 10:49 AM

## 2024-09-05 ENCOUNTER — OFFICE VISIT (OUTPATIENT)
Dept: OBGYN | Facility: OTHER | Age: 34
End: 2024-09-05
Payer: COMMERCIAL

## 2024-09-05 ENCOUNTER — MEDICAL CORRESPONDENCE (OUTPATIENT)
Dept: HEALTH INFORMATION MANAGEMENT | Facility: OTHER | Age: 34
End: 2024-09-05

## 2024-09-05 VITALS
DIASTOLIC BLOOD PRESSURE: 80 MMHG | SYSTOLIC BLOOD PRESSURE: 134 MMHG | BODY MASS INDEX: 30.38 KG/M2 | WEIGHT: 194 LBS | HEART RATE: 94 BPM

## 2024-09-05 PROCEDURE — G0463 HOSPITAL OUTPT CLINIC VISIT: HCPCS

## 2024-09-05 PROCEDURE — 99213 OFFICE O/P EST LOW 20 MIN: CPT

## 2024-09-05 ASSESSMENT — PAIN SCALES - GENERAL: PAINLEVEL: NO PAIN (0)

## 2024-09-05 NOTE — PROGRESS NOTES
Patient presents to clinic for blood pressure check.    Riaz Pizano LPN...........................9/5/2024 10:40 AM

## 2024-09-05 NOTE — PROGRESS NOTES
Follow-Up Visit    S: Ms. Caryl Andres is a 34 year old  here for pressure follow-up.  She reports her blood pressures on medication have been anywhere from 119 to 130s at home over 70s to 80s.  She states she is overall feeling well.  She has no side effects from the medication and is okay continuing this if we need to.    O:  /80   Pulse 94   Wt 88 kg (194 lb)   LMP 2023 (Exact Date)   Breastfeeding Yes   BMI 30.38 kg/m    Gen: Well-appearing, NAD  Pulm: nonlabored      A/P:  Ms. Caryl Andres is a 34 year old  here for blood pressure follow-up.  Discussed with patient that I would like her to continue the medication for 1 more week at which time we will likely be able to wean off.  I will see her in follow-up in 7 days.  She will keep a log of her blood pressures twice daily until that time.  She voices understanding and agreement with this plan.  Follow-up sooner as needed.      RICHMOND Everett-KYRA  2024 11:01 AM

## 2024-09-11 ENCOUNTER — HOSPITAL ENCOUNTER (OUTPATIENT)
Dept: OBGYN | Facility: OTHER | Age: 34
Discharge: HOME | End: 2024-09-11
Attending: FAMILY MEDICINE
Payer: COMMERCIAL

## 2024-09-11 ENCOUNTER — LACTATION ENCOUNTER (OUTPATIENT)
Dept: OBGYN | Facility: OTHER | Age: 34
End: 2024-09-11

## 2024-09-11 PROCEDURE — S9443 LACTATION CLASS: HCPCS | Performed by: REGISTERED NURSE

## 2024-09-11 NOTE — LACTATION NOTE
This note was copied from a baby's chart.  Outpatient Lactation Visit    Justyn Avina  [id number]    Consultation Date: 2024     Reason for Lactation Referral: Initial Lactation Consult    Baby's : 2024    Baby's Current Age: 2 week old  Baby's Gestational Age: Gestational Age: 40w1d    Primary Care Provider: Lakshmi Crowell    Presenting Problem (concerns as stated by parent): weight loss in breast feeding     MATERNAL HISTORY   History of Breast Surgery: no  Breast Changes During Pregnancy: no  Breast Feeding History: nursed last twins who are now 10 years old for a year  Maternal Meds: daily prenatal vitamin  Pregnancy Complications: none  Anesthesia during labor: epidural    MATERNAL ASSESSMENT    Breast Size: average, symmetrical, soft after feeding and filling prior to feeding  Nipple Appearance - Left: slightly cracked, with signs of healing, education on further healing techniques provided  Nipple Appearance - Right: slightly cracked, with signs of healing, education on further healing techniques provided  Nipple Erectility - Left: erect with stimulation  Nipple Erectility - Right: erect with stimulation  Areolas Compressibility: soft  Nipple Size: average  Special Equipment Used: 24 mm shield  Day mother reports milk came in:  Day 3    INFANT ASSESSMENT    Oral Anatomy  Mouth: normal  Palate: normal  Jaw: normal  Tongue: normal  Frenulum: normal   Digital Suck Exam: root    FEEDING   Feeding Time: aggressively for 30 minutes  Position:  cradle/football  Effort to Latch: awake and alert, latched easily  Duration of Breast Feeding: Right Breast: 15; Left Breast: 15  Results: good breast feed    Volume of Intake:  Birth Weight: 8 lb 9.3 oz  Hospital discharge weight: 8 lb 2.8 oz  Last weight (24): 7 lb 15 oz  Today's Weight 7 lb 14 oz  Total Intake: 0.8 oz  Output: 3-4 soil diapers in last 24 hours, 3-4 wet diapers in last 24 hours    LATCH Score:   Latch: 2 -  Good Latch  Audible Swallowin - Spontaneous & frequent  Type of Nipple: (Breast/Nipple) 2 - Everted  Comfort: 2 - Soft, Nontender  Hold: 2 - No Assist   Total LATCH Score:  10    FEEDING PLAN    Home Feeding Plan: Continue to feed on demand when  elicits feeding cues with deep latch.  Babe should be eating 8-12 times in a 24 hour period.  Exclusivity explained and encouraged in the early weeks to establish breastfeeding and order in milk supply.  Rooming-in encouraged with explanation of the benefits.  Continue to apply expressed breast milk and Lanolin cream to nipples after feedings for healing and comfort.  Postpartum breastfeeding assessment completed and education provided.  Items included in the education are:   proper positioning and latch  effectiveness of feeding  manual expression  handling and storing breastmilk  maintenance of breastfeeding for the first 6 months  sign/symptoms of infant feeding issues requiring referral to qualified health care provider    LACTATION COMMENTS   Justyn has lost another ounce since her last clinic appointment on 24. Mom states she has been feeding her every 2-3 hours. Instructed to continue feeding and to begin pumping after each breast feed session. Supplement with breast milk if available after each feeding session, or give formula if unable to express breast milk post each feeding session.   Deep latch explained for proper positioning of breast in infant's mouth, maximizing milk transfer and comfort.  Reassurance and encouragement provided in regard to mom's concerns about milk supply.  Follow-up support information provided.  Parents plan to keep weight check appointment as scheduled for  @ 10:30 am      Face-to-face Time: 60 minutes with assessment and education.    Jaja Figueroa RN  2024  10:56 AM

## 2024-09-16 ENCOUNTER — LACTATION ENCOUNTER (OUTPATIENT)
Dept: OBGYN | Facility: OTHER | Age: 34
End: 2024-09-16

## 2024-09-16 ENCOUNTER — OFFICE VISIT (OUTPATIENT)
Dept: OBGYN | Facility: OTHER | Age: 34
End: 2024-09-16
Payer: COMMERCIAL

## 2024-09-16 ENCOUNTER — HOSPITAL ENCOUNTER (OUTPATIENT)
Dept: OBGYN | Facility: OTHER | Age: 34
Discharge: HOME | End: 2024-09-16
Attending: FAMILY MEDICINE
Payer: COMMERCIAL

## 2024-09-16 VITALS
HEART RATE: 102 BPM | DIASTOLIC BLOOD PRESSURE: 88 MMHG | SYSTOLIC BLOOD PRESSURE: 138 MMHG | WEIGHT: 196.5 LBS | BODY MASS INDEX: 30.78 KG/M2

## 2024-09-16 PROCEDURE — 99213 OFFICE O/P EST LOW 20 MIN: CPT

## 2024-09-16 PROCEDURE — G0463 HOSPITAL OUTPT CLINIC VISIT: HCPCS

## 2024-09-16 ASSESSMENT — PAIN SCALES - GENERAL: PAINLEVEL: NO PAIN (0)

## 2024-09-16 NOTE — PROGRESS NOTES
Follow-Up Visit    S: Ms. Caryl Andres is a 34 year old  here for blood pressure check.  She reports her blood pressures at home have been in the 120s over 80s.  She notes that sometimes they 115 over 70s.  She reports feeling well with her blood pressures.  Currently she is ill with bodyaches fevers chills etc.  No concerns with her blood pressures however..    O:  BP (!) 146/86   Pulse 102   Wt 89.1 kg (196 lb 8 oz)   LMP 2023 (Exact Date)   Breastfeeding Yes   BMI 30.78 kg/m    Gen: Well-appearing, NAD  Pulm: nonlabored    A/P:  Ms. Caryl Andres is a 34 year old  here for blood pressure check.  In clinic today highest blood pressure is 146/86 however she did come down to 138/88.  Discussed with patient she can stop her nifedipine with the blood pressures in the 120s/115's.  She voices agreement this plan.  Discussed strict warning precautions.  She will notify the clinic if she begins having blood pressures over 140 on top or 90 on the bottom.  With this she will restart nifedipine.  Patient voices agreement and understanding of this plan.    MOSHE Everett  2024 11:12 AM

## 2024-09-16 NOTE — LACTATION NOTE
This note was copied from a baby's chart.  Justyn had her well child check right before this appointment and was 8 lbs 5 oz indicating an adequate weight gain since her last lactation appointment.     Caryl is nursing every 2-3 hours and supplementing with expressed breast milk if needed or formula.    Justyn will follow-up as scheduled for her next well child check with Dr. De La Paz.

## 2024-09-16 NOTE — PROGRESS NOTES
Patient presents to clinic for blood pressure check.    Riaz Pizano LPN...........................9/16/2024 11:05 AM

## 2024-10-10 ENCOUNTER — PRENATAL OFFICE VISIT (OUTPATIENT)
Dept: OBGYN | Facility: OTHER | Age: 34
End: 2024-10-10
Attending: FAMILY MEDICINE
Payer: COMMERCIAL

## 2024-10-10 VITALS
HEART RATE: 86 BPM | SYSTOLIC BLOOD PRESSURE: 118 MMHG | DIASTOLIC BLOOD PRESSURE: 72 MMHG | BODY MASS INDEX: 30.71 KG/M2 | WEIGHT: 196.1 LBS

## 2024-10-10 DIAGNOSIS — Z30.430 ENCOUNTER FOR IUD INSERTION: ICD-10-CM

## 2024-10-10 PROCEDURE — 58300 INSERT INTRAUTERINE DEVICE: CPT

## 2024-10-10 PROCEDURE — 99207 PR POST-PARTUM 6 WK VISIT - GICH ONLY: CPT

## 2024-10-10 PROCEDURE — 250N000011 HC RX IP 250 OP 636

## 2024-10-10 PROCEDURE — G0463 HOSPITAL OUTPT CLINIC VISIT: HCPCS | Mod: 25

## 2024-10-10 RX ADMIN — LEVONORGESTREL 1 EACH: 52 INTRAUTERINE DEVICE INTRAUTERINE at 11:48

## 2024-10-10 ASSESSMENT — PAIN SCALES - GENERAL: PAINLEVEL: NO PAIN (0)

## 2024-10-10 NOTE — PROGRESS NOTES
"6 week Postpartum Visit Note    S:  Ms. Caryl Andres is a 34 year old  here for her 6-week postpartum checkup.   - Had a  on 24.  - Infant gender:  girl, weight 8 pounds 9.3 oz.  - Feeding Method:  .  Complications reported with feeding:  none, infant thriving .    - Bleeding:  Light.  Duration:  weeks.  Menses resumed:  No  - Bowel/Urinary problems:  No  - Mood: feeling well adjusted  - Sleep: doing okay     [employer] Depression Scale  Thoughts of Harming Self:    Total Score:        - Contraception Planned:  IUD Mirena  - She  has not had intercourse since delivery..    - Current tobacco use:  No  - Hx of Abuse:  No  ================================================================  ROS: 10 point ROS neg other than the symptoms noted above in the HPI.     O:  /72   Pulse 86   Wt 89 kg (196 lb 1.6 oz)   Breastfeeding Yes   BMI 30.71 kg/m    Gen: Well-appearing, NAD  Psych:  NEGATIVE  Breast: deferred  Abd:  Benign, Soft, flat, non-tender, No masses, organomegaly, and Diastasis less than 1-2 FB  Inc:   None   Pelvic:  Normal appearing external female genitalia. Normal hair distribution. Vagina is without lesions. small discharge. Cervix normal, no lesions, no cervical motion tenderness. Uterus is small, mobile, non-tender. No adnexal tenderness or masses      IUD Insertion Note:      Time Out - \"Pause for the Cause\"  Just before the procedure begins, through verbal and active participation of team members, verify:    Initials   Patient Name    MAKENZIE   Patient Date of Birth 1990   Procedure to be performed  IUD insertion     Consent:  Verbal consent obtained from patient. , Risks, benefits of treatment, and no treatment were discussed.  Patient's questions were elicited and answered. , Written consent signed and scanned into medical record., and Patient received and verbalized understanding of discharge instructions    After informed consent was obtained from the patient, carla " speculum was placed in the vagina to visualized the cervix.  The cervix was then swabbed with a betadine prep x 3.   Tenaculum was placed at the 12 o'clock position on the cervix and the uterus sounded to 9cm.  The mirena  IUD was then placed in the usual fashion under sterile technique.  Strings were clipped about 2 cm from the cervical os.  Tenaculum was removed and cervix was hemostatic.  There were no complications.  The patient tolerated the procedure well.    A/P:  Ms. Caryl Andres is a 34 year old  here for 6 week postpartum visit after . Doing well.  - Contraception: IUD  - Feeding: breast   - Follow-up: one month     MOSHE Everett  10/10/2024 11:35 AM

## 2024-10-10 NOTE — PROGRESS NOTES
Patient presents to clinic for 6 week post partum visit. Patient opts for IUD insertion today. Patient declines UPT due to not being sexually active since delivery.    Riaz Pizano LPN...........................10/10/2024 11:32 AM

## 2024-11-04 ENCOUNTER — MEDICAL CORRESPONDENCE (OUTPATIENT)
Dept: HEALTH INFORMATION MANAGEMENT | Facility: OTHER | Age: 34
End: 2024-11-04
Payer: COMMERCIAL

## 2024-11-07 ENCOUNTER — OFFICE VISIT (OUTPATIENT)
Dept: OBGYN | Facility: OTHER | Age: 34
End: 2024-11-07
Payer: COMMERCIAL

## 2024-11-07 VITALS
SYSTOLIC BLOOD PRESSURE: 126 MMHG | DIASTOLIC BLOOD PRESSURE: 84 MMHG | WEIGHT: 202.5 LBS | HEART RATE: 94 BPM | BODY MASS INDEX: 31.72 KG/M2

## 2024-11-07 DIAGNOSIS — Z30.431 ENCOUNTER FOR ROUTINE CHECKING OF INTRAUTERINE CONTRACEPTIVE DEVICE (IUD): Primary | ICD-10-CM

## 2024-11-07 PROCEDURE — 99212 OFFICE O/P EST SF 10 MIN: CPT

## 2024-11-07 PROCEDURE — G0463 HOSPITAL OUTPT CLINIC VISIT: HCPCS

## 2024-11-07 ASSESSMENT — PAIN SCALES - GENERAL: PAINLEVEL_OUTOF10: NO PAIN (0)

## 2024-11-07 NOTE — PROGRESS NOTES
Patient presents to clinic today for IUD check.    Riaz Pizano LPN...........................11/7/2024 11:27 AM

## 2024-11-07 NOTE — PROGRESS NOTES
Follow-Up Visit    S: Ms. Carly Andres is a 34 year old  here for IUD check. She had a Mirena placed on 10/10/24. She reports no concerns..     O:  /84   Pulse 94   Wt 91.9 kg (202 lb 8 oz)   Breastfeeding Yes   BMI 31.72 kg/m    Gen: Well-appearing, NAD  Pulm: nonlabored  Psych: appropriate mood and affect    Pelvic:  Normal appearing external female genitalia. Normal hair distribution. Vagina is without lesions. small discharge. Cervix normal, IUD strings appear appropriate length    A/P:  Ms. Caryl Andres is a 34 year old  here for IUD check. I have reviewed allergies, medication list, problem list, and past medical history. Discussed expected bleeding patterns.   - RTC 1 year for IUD check or sooner prn    MOSHE Everett  2024 11:34 AM

## 2025-03-09 ENCOUNTER — HEALTH MAINTENANCE LETTER (OUTPATIENT)
Age: 35
End: 2025-03-09

## 2025-07-07 NOTE — TELEPHONE ENCOUNTER
Mychart message from SHANNON Abernathy CNP not seen. Called and spoke with patient who verbalized understanding.  Clarita Rascon RN on 4/8/2024 at 2:14 PM    
Statement Selected

## 2025-08-04 ENCOUNTER — OFFICE VISIT (OUTPATIENT)
Dept: FAMILY MEDICINE | Facility: OTHER | Age: 35
End: 2025-08-04
Payer: COMMERCIAL

## 2025-08-04 VITALS
WEIGHT: 207 LBS | BODY MASS INDEX: 32.49 KG/M2 | RESPIRATION RATE: 17 BRPM | SYSTOLIC BLOOD PRESSURE: 122 MMHG | HEART RATE: 74 BPM | TEMPERATURE: 98.6 F | OXYGEN SATURATION: 96 % | HEIGHT: 67 IN | DIASTOLIC BLOOD PRESSURE: 68 MMHG

## 2025-08-04 DIAGNOSIS — H44.002 INFECTION OF LEFT EYE: Primary | ICD-10-CM

## 2025-08-04 PROCEDURE — 87070 CULTURE OTHR SPECIMN AEROBIC: CPT | Mod: ZL | Performed by: NURSE PRACTITIONER

## 2025-08-04 PROCEDURE — 99213 OFFICE O/P EST LOW 20 MIN: CPT | Performed by: NURSE PRACTITIONER

## 2025-08-04 PROCEDURE — G0463 HOSPITAL OUTPT CLINIC VISIT: HCPCS

## 2025-08-04 RX ORDER — ERYTHROMYCIN 5 MG/G
OINTMENT OPHTHALMIC
Qty: 3.5 G | Refills: 0 | Status: SHIPPED | OUTPATIENT
Start: 2025-08-04 | End: 2025-08-04

## 2025-08-04 RX ORDER — ERYTHROMYCIN 5 MG/G
OINTMENT OPHTHALMIC
Qty: 3.5 G | Refills: 0 | Status: SHIPPED | OUTPATIENT
Start: 2025-08-04

## 2025-08-04 ASSESSMENT — ENCOUNTER SYMPTOMS
EYE PAIN: 1
NEUROLOGICAL NEGATIVE: 1
HEMATOLOGIC/LYMPHATIC NEGATIVE: 1
CARDIOVASCULAR NEGATIVE: 1
ENDOCRINE NEGATIVE: 1
EYE REDNESS: 1
CONSTITUTIONAL NEGATIVE: 1
GASTROINTESTINAL NEGATIVE: 1
EYE ITCHING: 1
EYE DISCHARGE: 1
MUSCULOSKELETAL NEGATIVE: 1
RESPIRATORY NEGATIVE: 1
PSYCHIATRIC NEGATIVE: 1

## 2025-08-04 ASSESSMENT — PAIN SCALES - GENERAL: PAINLEVEL_OUTOF10: NO PAIN (0)

## 2025-08-06 LAB
BACTERIA SKIN AEROBE CULT: NORMAL
GRAM STAIN RESULT: NORMAL
GRAM STAIN RESULT: NORMAL